# Patient Record
Sex: FEMALE | Race: WHITE | Employment: OTHER | URBAN - METROPOLITAN AREA
[De-identification: names, ages, dates, MRNs, and addresses within clinical notes are randomized per-mention and may not be internally consistent; named-entity substitution may affect disease eponyms.]

---

## 2022-04-16 ENCOUNTER — HOSPITAL ENCOUNTER (EMERGENCY)
Age: 88
Discharge: HOME | End: 2022-04-16
Attending: EMERGENCY MEDICINE
Payer: MEDICARE

## 2022-04-16 ENCOUNTER — APPOINTMENT (OUTPATIENT)
Dept: CT IMAGING | Age: 88
End: 2022-04-16
Attending: EMERGENCY MEDICINE
Payer: MEDICARE

## 2022-04-16 VITALS
RESPIRATION RATE: 16 BRPM | OXYGEN SATURATION: 94 % | TEMPERATURE: 97 F | HEART RATE: 63 BPM | DIASTOLIC BLOOD PRESSURE: 60 MMHG | SYSTOLIC BLOOD PRESSURE: 142 MMHG | WEIGHT: 186.95 LBS

## 2022-04-16 DIAGNOSIS — S00.83XA CONTUSION OF FACE, INITIAL ENCOUNTER: ICD-10-CM

## 2022-04-16 DIAGNOSIS — S01.81XA FACIAL LACERATION, INITIAL ENCOUNTER: ICD-10-CM

## 2022-04-16 DIAGNOSIS — S09.90XA INJURY OF HEAD, INITIAL ENCOUNTER: Primary | ICD-10-CM

## 2022-04-16 LAB
INR PPP: 1.7 (ref 0.9–1.1)
PROTHROMBIN TIME: 16.6 SEC (ref 9–11.1)

## 2022-04-16 PROCEDURE — 85610 PROTHROMBIN TIME: CPT

## 2022-04-16 PROCEDURE — 75810000293 HC SIMP/SUPERF WND  RPR

## 2022-04-16 PROCEDURE — 36415 COLL VENOUS BLD VENIPUNCTURE: CPT

## 2022-04-16 PROCEDURE — 99284 EMERGENCY DEPT VISIT MOD MDM: CPT

## 2022-04-16 PROCEDURE — 72125 CT NECK SPINE W/O DYE: CPT

## 2022-04-16 PROCEDURE — 70450 CT HEAD/BRAIN W/O DYE: CPT

## 2022-04-16 RX ORDER — BACITRACIN 500 UNIT/G
1 PACKET (EA) TOPICAL
Status: DISCONTINUED | OUTPATIENT
Start: 2022-04-16 | End: 2022-04-16 | Stop reason: HOSPADM

## 2022-04-16 RX ORDER — LIDOCAINE HYDROCHLORIDE AND EPINEPHRINE 10; 10 MG/ML; UG/ML
10 INJECTION, SOLUTION INFILTRATION; PERINEURAL ONCE
Status: DISCONTINUED | OUTPATIENT
Start: 2022-04-16 | End: 2022-04-16 | Stop reason: HOSPADM

## 2022-04-16 NOTE — ED TRIAGE NOTES
Pt states she had a GLF tonight while ambulating to bathroom, tripped over sink and hit front left forehead on concrete floor. Pt is taking coumadin for DVT in LLE. Pt denies any dizziness, headache, visual disturbances, pain, or any other symptoms. Denies LOC.

## 2022-04-16 NOTE — ED NOTES
7AM  Change of shift. Care of patient taken over from Dr. Idris Daniels; H&P reviewed, bedside handoff complete. Awaiting INR and CT results    CT results are negative. Discussed with patient  INR subtherapeutic. Discussed with patient, she has not taken her dose yet today  She has no complaints or concerns at time of discharge. No new pains or injuries not addressed earlier. Okay for discharge home. ICD-10-CM ICD-9-CM    1. Injury of head, initial encounter  S09.90XA 959.01    2. Facial laceration, initial encounter  S01.81XA 873.40    3.  Contusion of face, initial encounter  [address] [facility] [address], DO

## 2022-04-16 NOTE — ED NOTES
Bedside and Verbal shift change report given to See Christian RN (oncoming nurse) by Reji Palomo RN (offgoing nurse). Report included the following information SBAR, ED Summary, Intake/Output, MAR and Recent Results.

## 2022-04-16 NOTE — ED PROVIDER NOTES
81-year-old female with a past medical history significant for DVT and chronic anticoagulation with warfarin who presents to the ER after she had a ground-level fall during which time she lost her balance and hit left side of her head against the bathroom sink. She sustained a laceration to the forehead and left eyebrow that was controlled with pressure. She is complaining of headache and neck pain. Denies any loss of consciousness, nausea or vomiting, chest pain or shortness of breath, abdominal pain, dizziness, extremity weakness or numbness. No past medical history on file. No past surgical history on file. No family history on file. Social History     Socioeconomic History    Marital status:      Spouse name: Not on file    Number of children: Not on file    Years of education: Not on file    Highest education level: Not on file   Occupational History    Not on file   Tobacco Use    Smoking status: Not on file    Smokeless tobacco: Not on file   Substance and Sexual Activity    Alcohol use: Not on file    Drug use: Not on file    Sexual activity: Not on file   Other Topics Concern    Not on file   Social History Narrative    Not on file     Social Determinants of Health     Financial Resource Strain:     Difficulty of Paying Living Expenses: Not on file   Food Insecurity:     Worried About Running Out of Food in the Last Year: Not on file    Hue of Food in the Last Year: Not on file   Transportation Needs:     Lack of Transportation (Medical): Not on file    Lack of Transportation (Non-Medical):  Not on file   Physical Activity:     Days of Exercise per Week: Not on file    Minutes of Exercise per Session: Not on file   Stress:     Feeling of Stress : Not on file   Social Connections:     Frequency of Communication with Friends and Family: Not on file    Frequency of Social Gatherings with Friends and Family: Not on file    Attends Scientology Services: Not on file   [address] or Organizations: Not on file    Attends Club or Organization Meetings: Not on file    Marital Status: Not on file   Intimate Partner Violence:     Fear of Current or Ex-Partner: Not on file    Emotionally Abused: Not on file    Physically Abused: Not on file    Sexually Abused: Not on file   Housing Stability:     Unable to Pay for Housing in the Last Year: Not on file    Number of [place] in the Last Year: Not on file    Unstable Housing in the Last Year: Not on file         ALLERGIES: Patient has no allergy information on record. Review of Systems   All other systems reviewed and are negative. There were no vitals filed for this visit. Physical Exam  Vitals and nursing note reviewed. Exam conducted with a chaperone present. CONSTITUTIONAL: Well-appearing; well-nourished; in no apparent distress  HEAD: Normocephalic; 2 cm superficial laceration to the left eyebrow and forehead with swelling and hematoma  EYES: PERRL; EOM intact; conjunctiva and sclera are clear bilaterally. ENT: No rhinorrhea; normal pharynx with no tonsillar hypertrophy; mucous membranes pink/moist, no erythema, no exudate. NECK: Supple; non-tender; no cervical lymphadenopathy  CARD: Normal S1, S2; no murmurs, rubs, or gallops. Regular rate and rhythm. RESP: Normal respiratory effort; breath sounds clear and equal bilaterally; no wheezes, rhonchi, or rales. ABD: Normal bowel sounds; non-distended; non-tender; no palpable organomegaly, no masses, no bruits. Back Exam: Normal inspection; no vertebral point tenderness, no CVA tenderness. Normal range of motion. EXT: Normal ROM in all four extremities; non-tender to palpation; no swelling or deformity; distal pulses are normal, no edema. SKIN: Warm; dry; no rash.   NEURO:Alert and oriented x 3, coherent, SIENNA-XII grossly intact, sensory and motor are non-focal.        MDM  Number of Diagnoses or Management Options  Diagnosis management comments: Assessment: 26-year-old female who presents to the ER for evaluation for head injury with facial laceration status post fall. The patient has chronic anticoagulation with warfarin. She is hemodynamically stable. Need evaluation for ICH or skull fracture. Plan: CT scan of the head and cervical spine/wound care and laceration repair/lab /analgesia radiographic study/ Monitor and Reevaluate. Amount and/or Complexity of Data Reviewed  Clinical lab tests: ordered and reviewed  Tests in the radiology section of CPT®: ordered and reviewed  Tests in the medicine section of CPT®: reviewed and ordered  Discussion of test results with the performing providers: yes  Decide to obtain previous medical records or to obtain history from someone other than the patient: yes  Obtain history from someone other than the patient: yes  Review and summarize past medical records: yes  Discuss the patient with other providers: yes  Independent visualization of images, tracings, or specimens: yes    Risk of Complications, Morbidity, and/or Mortality  Presenting problems: moderate  Diagnostic procedures: moderate  Management options: moderate    Patient Progress  Patient progress: stable         Wound Closure by Adhesive    Date/Time: 4/16/2022 7:04 AM  Performed by: Sofia Crowell MD  Authorized by: Sofia Crowell MD     Consent:     Consent obtained:  Verbal    Consent given by:  Patient    Risks discussed:  Pain, poor cosmetic result and poor wound healing    Alternatives discussed:  No treatment  Anesthesia (see MAR for exact dosages): Anesthesia method:  Local infiltration    Local anesthetic:  Lidocaine 1% WITH epi  Laceration details:     Location: Left eyebrow and forehead. Length (cm):  2    Laceration depth: Superficial.  Repair type:     Repair type:   Intermediate  Pre-procedure details:     Preparation:  Patient was prepped and draped in usual sterile fashion and imaging obtained to evaluate for foreign bodies  Exploration:     Hemostasis achieved with:  Direct pressure    Wound exploration: wound explored through full range of motion and entire depth of wound probed and visualized    Treatment:     Area cleansed with:  Saline    Amount of cleaning:  Extensive    Irrigation solution:  Sterile saline    Irrigation volume:  50    Irrigation method:  Syringe    Visualized foreign bodies/material removed: no    Skin repair:     Repair method:  Sutures    Suture size:  6-0    Suture material:  Prolene    Suture technique:  Simple interrupted    Number of sutures:  10  Approximation:     Approximation:  Close  Post-procedure details:     Dressing:  Antibiotic ointment and bulky dressing    Patient tolerance of procedure: Tolerated well, no immediate complications        6:25 AM  Change of shift. Care of patient signed over to Dr. Mela Hay. Bedside handoff complete. Awaiting lab and CT results and will make disposition as clinically relevant.

## 2024-08-12 ENCOUNTER — TELEPHONE (OUTPATIENT)
Dept: OTHER | Facility: CLINIC | Age: 89
End: 2024-08-12

## 2024-08-12 NOTE — TELEPHONE ENCOUNTER
Patient was outreached to as part of Population Health scheduling activity. Patient may be due for a visit with their primary care provider.    Outcome of call: PATIENT UNAVAILABLE - UNABLE TO LEAVE VOICEMAIL

## 2024-12-09 ENCOUNTER — APPOINTMENT (OUTPATIENT)
Dept: VASCULAR SURGERY | Facility: HOSPITAL | Age: 89
End: 2024-12-09
Attending: EMERGENCY MEDICINE
Payer: MEDICARE

## 2024-12-09 ENCOUNTER — HOSPITAL ENCOUNTER (EMERGENCY)
Facility: HOSPITAL | Age: 89
Discharge: HOME | End: 2024-12-09
Attending: EMERGENCY MEDICINE
Payer: MEDICARE

## 2024-12-09 VITALS
BODY MASS INDEX: 29.73 KG/M2 | HEART RATE: 78 BPM | OXYGEN SATURATION: 95 % | RESPIRATION RATE: 20 BRPM | HEIGHT: 68 IN | WEIGHT: 196.2 LBS | TEMPERATURE: 97.7 F | DIASTOLIC BLOOD PRESSURE: 84 MMHG | SYSTOLIC BLOOD PRESSURE: 147 MMHG

## 2024-12-09 DIAGNOSIS — I82.431 ACUTE DEEP VEIN THROMBOSIS (DVT) OF RIGHT POPLITEAL VEIN (HCC): Primary | ICD-10-CM

## 2024-12-09 LAB
ALBUMIN SERPL-MCNC: 3.6 G/DL (ref 3.5–5)
ALBUMIN/GLOB SERPL: 1 (ref 1.1–2.2)
ALP SERPL-CCNC: 99 U/L (ref 45–117)
ALT SERPL-CCNC: 20 U/L (ref 12–78)
ANION GAP SERPL CALC-SCNC: 6 MMOL/L (ref 2–12)
AST SERPL-CCNC: 22 U/L (ref 15–37)
BASOPHILS # BLD: 0.1 K/UL (ref 0–0.1)
BASOPHILS NFR BLD: 1 % (ref 0–1)
BILIRUB SERPL-MCNC: 0.8 MG/DL (ref 0.2–1)
BUN SERPL-MCNC: 12 MG/DL (ref 6–20)
BUN/CREAT SERPL: 12 (ref 12–20)
CALCIUM SERPL-MCNC: 9.3 MG/DL (ref 8.5–10.1)
CHLORIDE SERPL-SCNC: 108 MMOL/L (ref 97–108)
CO2 SERPL-SCNC: 27 MMOL/L (ref 21–32)
CREAT SERPL-MCNC: 1.04 MG/DL (ref 0.55–1.02)
DIFFERENTIAL METHOD BLD: ABNORMAL
ECHO BSA: 2.07 M2
EOSINOPHIL # BLD: 0.1 K/UL (ref 0–0.4)
EOSINOPHIL NFR BLD: 1 % (ref 0–7)
ERYTHROCYTE [DISTWIDTH] IN BLOOD BY AUTOMATED COUNT: 13.8 % (ref 11.5–14.5)
GLOBULIN SER CALC-MCNC: 3.7 G/DL (ref 2–4)
GLUCOSE SERPL-MCNC: 120 MG/DL (ref 65–100)
HCT VFR BLD AUTO: 39.6 % (ref 35–47)
HGB BLD-MCNC: 12.8 G/DL (ref 11.5–16)
IMM GRANULOCYTES # BLD AUTO: 0.1 K/UL (ref 0–0.04)
IMM GRANULOCYTES NFR BLD AUTO: 1 % (ref 0–0.5)
LYMPHOCYTES # BLD: 1.3 K/UL (ref 0.8–3.5)
LYMPHOCYTES NFR BLD: 19 % (ref 12–49)
MCH RBC QN AUTO: 33 PG (ref 26–34)
MCHC RBC AUTO-ENTMCNC: 32.3 G/DL (ref 30–36.5)
MCV RBC AUTO: 102.1 FL (ref 80–99)
MONOCYTES # BLD: 0.5 K/UL (ref 0–1)
MONOCYTES NFR BLD: 8 % (ref 5–13)
NEUTS SEG # BLD: 4.8 K/UL (ref 1.8–8)
NEUTS SEG NFR BLD: 70 % (ref 32–75)
NRBC # BLD: 0 K/UL (ref 0–0.01)
NRBC BLD-RTO: 0 PER 100 WBC
NT PRO BNP: 833 PG/ML
PLATELET # BLD AUTO: 162 K/UL (ref 150–400)
PMV BLD AUTO: 10.3 FL (ref 8.9–12.9)
POTASSIUM SERPL-SCNC: 3.5 MMOL/L (ref 3.5–5.1)
PROT SERPL-MCNC: 7.3 G/DL (ref 6.4–8.2)
RBC # BLD AUTO: 3.88 M/UL (ref 3.8–5.2)
SODIUM SERPL-SCNC: 141 MMOL/L (ref 136–145)
WBC # BLD AUTO: 6.8 K/UL (ref 3.6–11)

## 2024-12-09 PROCEDURE — 85025 COMPLETE CBC W/AUTO DIFF WBC: CPT

## 2024-12-09 PROCEDURE — 99284 EMERGENCY DEPT VISIT MOD MDM: CPT

## 2024-12-09 PROCEDURE — 93970 EXTREMITY STUDY: CPT

## 2024-12-09 PROCEDURE — 80053 COMPREHEN METABOLIC PANEL: CPT

## 2024-12-09 PROCEDURE — 36415 COLL VENOUS BLD VENIPUNCTURE: CPT

## 2024-12-09 PROCEDURE — 83880 ASSAY OF NATRIURETIC PEPTIDE: CPT

## 2024-12-09 ASSESSMENT — PAIN - FUNCTIONAL ASSESSMENT: PAIN_FUNCTIONAL_ASSESSMENT: NONE - DENIES PAIN

## 2024-12-09 NOTE — ED NOTES
Report given to [facility] on patient going back to facility. Report called and given to Roberto at [facility].

## 2024-12-09 NOTE — ED TRIAGE NOTES
Pt arrives with EMS from [facility]. Nursing staff noticed bilateral LE edema this morning. That is not normal for her and no pain. HX of dementia.

## 2024-12-09 NOTE — ED PROVIDER NOTES
[facility] EMERGENCY DEPT  EMERGENCY DEPARTMENT ENCOUNTER      Pt Name: Naty Mendoza  MRN: [id number]  Birthdate 4/27/1930  Date of evaluation: 12/9/2024  Provider: Khris Grewal MD      HISTORY OF PRESENT ILLNESS      89-year-old female history of hypertension, heart failure, DVT presents to the emergency department EMS from [facility] with concern for bilateral lower extremity swelling.  The patient tells me she has had the swelling for a long time, although, she seems somewhat confused.  Pleasantly confused.  EMS reports they were called to the facility because the swelling is new.  Patient has no history of shortness of breath.  I see history of DVT in the chart but do not see that she is on blood thinners.  She is on Bumex daily.    The history is provided by the patient, the EMS personnel and medical records.           Nursing Notes were reviewed.    REVIEW OF SYSTEMS         Review of Systems        PAST MEDICAL HISTORY     Past Medical History:   Diagnosis Date    DVT of axillary vein, acute (HCC)     Heart failure (HCC)     Hypertension          SURGICAL HISTORY     No past surgical history on file.      CURRENT MEDICATIONS       Previous Medications    No medications on file       ALLERGIES     Patient has no allergy information on record.    FAMILY HISTORY     No family history on file.       SOCIAL HISTORY       Social History     Socioeconomic History    Marital status:    Tobacco Use    Smoking status: Never    Smokeless tobacco: Never   Substance and Sexual Activity    Alcohol use: Not Currently    Drug use: Never         PHYSICAL EXAM       ED Triage Vitals   BP Systolic BP Percentile Diastolic BP Percentile Temp Temp src Pulse Resp SpO2   -- -- -- -- -- -- -- --      Height Weight         -- --             There is no height or weight on file to calculate BMI.    Physical Exam  Vitals and nursing note reviewed.   Constitutional:       Appearance: She is not ill-appearing.

## 2025-03-02 ENCOUNTER — HOSPITAL ENCOUNTER (INPATIENT)
Facility: HOSPITAL | Age: 89
LOS: 3 days | Discharge: NURSING, REHAB OR LONG-TERM CARE | DRG: 194 | End: 2025-03-06
Attending: EMERGENCY MEDICINE | Admitting: FAMILY MEDICINE
Payer: MEDICARE

## 2025-03-02 ENCOUNTER — APPOINTMENT (OUTPATIENT)
Facility: HOSPITAL | Age: 89
DRG: 194 | End: 2025-03-02
Payer: MEDICARE

## 2025-03-02 ENCOUNTER — HOSPITAL ENCOUNTER (EMERGENCY)
Facility: HOSPITAL | Age: 89
Discharge: HOME | DRG: 194 | End: 2025-03-05
Payer: MEDICARE

## 2025-03-02 DIAGNOSIS — I50.9 ACUTE ON CHRONIC CONGESTIVE HEART FAILURE, UNSPECIFIED HEART FAILURE TYPE (HCC): ICD-10-CM

## 2025-03-02 DIAGNOSIS — J96.01 ACUTE RESPIRATORY FAILURE WITH HYPOXIA (HCC): Primary | ICD-10-CM
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
## 2025-03-02 DIAGNOSIS — I50.9 CONGESTIVE HEART FAILURE, UNSPECIFIED HF CHRONICITY, UNSPECIFIED HEART FAILURE TYPE (HCC): ICD-10-CM

## 2025-03-02 DIAGNOSIS — J18.9 PNEUMONIA OF LEFT UPPER LOBE DUE TO INFECTIOUS ORGANISM: ICD-10-CM

## 2025-03-02 LAB
ALBUMIN SERPL-MCNC: 3 G/DL (ref 3.5–5)
ALBUMIN/GLOB SERPL: 0.8 (ref 1.1–2.2)
ALP SERPL-CCNC: 117 U/L (ref 45–117)
ALT SERPL-CCNC: 38 U/L (ref 12–78)
ANION GAP SERPL CALC-SCNC: 7 MMOL/L (ref 2–12)
AST SERPL-CCNC: 49 U/L (ref 15–37)
BASOPHILS # BLD: 0.08 K/UL (ref 0–0.1)
BASOPHILS NFR BLD: 0.7 % (ref 0–1)
BILIRUB SERPL-MCNC: 1.4 MG/DL (ref 0.2–1)
BUN SERPL-MCNC: 15 MG/DL (ref 6–20)
BUN/CREAT SERPL: 14 (ref 12–20)
CALCIUM SERPL-MCNC: 9.2 MG/DL (ref 8.5–10.1)
CHLORIDE SERPL-SCNC: 107 MMOL/L (ref 97–108)
CO2 SERPL-SCNC: 27 MMOL/L (ref 21–32)
COMMENT:: NORMAL
CREAT SERPL-MCNC: 1.07 MG/DL (ref 0.55–1.02)
DIFFERENTIAL METHOD BLD: ABNORMAL
EOSINOPHIL # BLD: 0.05 K/UL (ref 0–0.4)
EOSINOPHIL NFR BLD: 0.4 % (ref 0–7)
ERYTHROCYTE [DISTWIDTH] IN BLOOD BY AUTOMATED COUNT: 13.7 % (ref 11.5–14.5)
FLUAV RNA SPEC QL NAA+PROBE: NOT DETECTED
FLUBV RNA SPEC QL NAA+PROBE: NOT DETECTED
GLOBULIN SER CALC-MCNC: 4 G/DL (ref 2–4)
GLUCOSE SERPL-MCNC: 118 MG/DL (ref 65–100)
HCT VFR BLD AUTO: 32.7 % (ref 35–47)
HGB BLD-MCNC: 10.8 G/DL (ref 11.5–16)
IMM GRANULOCYTES # BLD AUTO: 0.11 K/UL (ref 0–0.04)
IMM GRANULOCYTES NFR BLD AUTO: 0.9 % (ref 0–0.5)
LYMPHOCYTES # BLD: 0.95 K/UL (ref 0.8–3.5)
LYMPHOCYTES NFR BLD: 8.2 % (ref 12–49)
MAGNESIUM SERPL-MCNC: 2 MG/DL (ref 1.6–2.4)
MCH RBC QN AUTO: 32.5 PG (ref 26–34)
MCHC RBC AUTO-ENTMCNC: 33 G/DL (ref 30–36.5)
MCV RBC AUTO: 98.5 FL (ref 80–99)
MONOCYTES # BLD: 0.93 K/UL (ref 0–1)
MONOCYTES NFR BLD: 8 % (ref 5–13)
NEUTS SEG # BLD: 9.48 K/UL (ref 1.8–8)
NEUTS SEG NFR BLD: 81.8 % (ref 32–75)
NRBC # BLD: 0 K/UL (ref 0–0.01)
NRBC BLD-RTO: 0 PER 100 WBC
NT PRO BNP: 2684 PG/ML
PLATELET # BLD AUTO: 191 K/UL (ref 150–400)
PMV BLD AUTO: 11.5 FL (ref 8.9–12.9)
POTASSIUM SERPL-SCNC: 3.3 MMOL/L (ref 3.5–5.1)
PROT SERPL-MCNC: 7 G/DL (ref 6.4–8.2)
RBC # BLD AUTO: 3.32 M/UL (ref 3.8–5.2)
SARS-COV-2 RNA RESP QL NAA+PROBE: NOT DETECTED
SODIUM SERPL-SCNC: 141 MMOL/L (ref 136–145)
SOURCE: NORMAL
SPECIMEN HOLD: NORMAL
TROPONIN I SERPL HS-MCNC: 27 NG/L (ref 0–51)
WBC # BLD AUTO: 11.6 K/UL (ref 3.6–11)

## 2025-03-02 PROCEDURE — 94761 N-INVAS EAR/PLS OXIMETRY MLT: CPT

## 2025-03-02 PROCEDURE — 96375 TX/PRO/DX INJ NEW DRUG ADDON: CPT

## 2025-03-02 PROCEDURE — 83880 ASSAY OF NATRIURETIC PEPTIDE: CPT

## 2025-03-02 PROCEDURE — 71046 X-RAY EXAM CHEST 2 VIEWS: CPT

## 2025-03-02 PROCEDURE — 80053 COMPREHEN METABOLIC PANEL: CPT

## 2025-03-02 PROCEDURE — 6370000000 HC RX 637 (ALT 250 FOR IP): Performed by: EMERGENCY MEDICINE

## 2025-03-02 PROCEDURE — 2500000003 HC RX 250 WO HCPCS: Performed by: EMERGENCY MEDICINE

## 2025-03-02 PROCEDURE — G0378 HOSPITAL OBSERVATION PER HR: HCPCS

## 2025-03-02 PROCEDURE — 93005 ELECTROCARDIOGRAM TRACING: CPT | Performed by: EMERGENCY MEDICINE

## 2025-03-02 PROCEDURE — 87636 SARSCOV2 & INF A&B AMP PRB: CPT

## 2025-03-02 PROCEDURE — 94640 AIRWAY INHALATION TREATMENT: CPT

## 2025-03-02 PROCEDURE — 83735 ASSAY OF MAGNESIUM: CPT

## 2025-03-02 PROCEDURE — 6360000002 HC RX W HCPCS: Performed by: STUDENT IN AN ORGANIZED HEALTH CARE EDUCATION/TRAINING PROGRAM

## 2025-03-02 PROCEDURE — 72125 CT NECK SPINE W/O DYE: CPT

## 2025-03-02 PROCEDURE — 96365 THER/PROPH/DIAG IV INF INIT: CPT

## 2025-03-02 PROCEDURE — 6370000000 HC RX 637 (ALT 250 FOR IP): Performed by: STUDENT IN AN ORGANIZED HEALTH CARE EDUCATION/TRAINING PROGRAM

## 2025-03-02 PROCEDURE — 99285 EMERGENCY DEPT VISIT HI MDM: CPT

## 2025-03-02 PROCEDURE — 6360000002 HC RX W HCPCS: Performed by: EMERGENCY MEDICINE

## 2025-03-02 PROCEDURE — 2500000003 HC RX 250 WO HCPCS: Performed by: STUDENT IN AN ORGANIZED HEALTH CARE EDUCATION/TRAINING PROGRAM

## 2025-03-02 PROCEDURE — 36415 COLL VENOUS BLD VENIPUNCTURE: CPT

## 2025-03-02 PROCEDURE — 84484 ASSAY OF TROPONIN QUANT: CPT

## 2025-03-02 PROCEDURE — 2580000003 HC RX 258: Performed by: EMERGENCY MEDICINE

## 2025-03-02 PROCEDURE — 85025 COMPLETE CBC W/AUTO DIFF WBC: CPT

## 2025-03-02 PROCEDURE — 70450 CT HEAD/BRAIN W/O DYE: CPT

## 2025-03-02 PROCEDURE — 73610 X-RAY EXAM OF ANKLE: CPT

## 2025-03-02 RX ORDER — FUROSEMIDE 10 MG/ML
20 INJECTION INTRAMUSCULAR; INTRAVENOUS 2 TIMES DAILY
Status: DISCONTINUED | OUTPATIENT
Start: 2025-03-02 | End: 2025-03-02

## 2025-03-02 RX ORDER — POTASSIUM CHLORIDE 7.45 MG/ML
10 INJECTION INTRAVENOUS PRN
Status: DISCONTINUED | OUTPATIENT
Start: 2025-03-02 | End: 2025-03-06 | Stop reason: HOSPADM

## 2025-03-02 RX ORDER — POTASSIUM CHLORIDE 750 MG/1
40 TABLET, EXTENDED RELEASE ORAL PRN
Status: DISCONTINUED | OUTPATIENT
Start: 2025-03-02 | End: 2025-03-06 | Stop reason: HOSPADM

## 2025-03-02 RX ORDER — FUROSEMIDE 10 MG/ML
40 INJECTION INTRAMUSCULAR; INTRAVENOUS ONCE
Status: COMPLETED | OUTPATIENT
Start: 2025-03-02 | End: 2025-03-02

## 2025-03-02 RX ORDER — ACETAMINOPHEN 650 MG/1
650 SUPPOSITORY RECTAL EVERY 6 HOURS PRN
Status: DISCONTINUED | OUTPATIENT
Start: 2025-03-02 | End: 2025-03-06 | Stop reason: HOSPADM

## 2025-03-02 RX ORDER — ONDANSETRON 4 MG/1
4 TABLET, ORALLY DISINTEGRATING ORAL EVERY 8 HOURS PRN
Status: DISCONTINUED | OUTPATIENT
Start: 2025-03-02 | End: 2025-03-06 | Stop reason: HOSPADM

## 2025-03-02 RX ORDER — IPRATROPIUM BROMIDE AND ALBUTEROL SULFATE 2.5; .5 MG/3ML; MG/3ML
1 SOLUTION RESPIRATORY (INHALATION)
Status: DISCONTINUED | OUTPATIENT
Start: 2025-03-02 | End: 2025-03-03

## 2025-03-02 RX ORDER — IPRATROPIUM BROMIDE AND ALBUTEROL SULFATE 2.5; .5 MG/3ML; MG/3ML
1 SOLUTION RESPIRATORY (INHALATION)
Status: COMPLETED | OUTPATIENT
Start: 2025-03-02 | End: 2025-03-02

## 2025-03-02 RX ORDER — ACETAMINOPHEN 325 MG/1
650 TABLET ORAL EVERY 6 HOURS PRN
Status: DISCONTINUED | OUTPATIENT
Start: 2025-03-02 | End: 2025-03-06 | Stop reason: HOSPADM

## 2025-03-02 RX ORDER — POTASSIUM CHLORIDE 750 MG/1
40 TABLET, EXTENDED RELEASE ORAL ONCE
Status: COMPLETED | OUTPATIENT
Start: 2025-03-02 | End: 2025-03-02

## 2025-03-02 RX ORDER — DOXYCYCLINE HYCLATE 100 MG
100 TABLET ORAL EVERY 12 HOURS SCHEDULED
Status: DISCONTINUED | OUTPATIENT
Start: 2025-03-02 | End: 2025-03-06 | Stop reason: HOSPADM

## 2025-03-02 RX ORDER — SODIUM CHLORIDE 0.9 % (FLUSH) 0.9 %
5-40 SYRINGE (ML) INJECTION PRN
Status: DISCONTINUED | OUTPATIENT
Start: 2025-03-02 | End: 2025-03-06 | Stop reason: HOSPADM

## 2025-03-02 RX ORDER — METOPROLOL SUCCINATE 25 MG/1
25 TABLET, EXTENDED RELEASE ORAL DAILY
Status: DISCONTINUED | OUTPATIENT
Start: 2025-03-02 | End: 2025-03-06 | Stop reason: HOSPADM

## 2025-03-02 RX ORDER — ENOXAPARIN SODIUM 100 MG/ML
40 INJECTION SUBCUTANEOUS DAILY
Status: DISCONTINUED | OUTPATIENT
Start: 2025-03-02 | End: 2025-03-02

## 2025-03-02 RX ORDER — SODIUM CHLORIDE 9 MG/ML
INJECTION, SOLUTION INTRAVENOUS PRN
Status: DISCONTINUED | OUTPATIENT
Start: 2025-03-02 | End: 2025-03-06 | Stop reason: HOSPADM

## 2025-03-02 RX ORDER — MAGNESIUM SULFATE IN WATER 40 MG/ML
2000 INJECTION, SOLUTION INTRAVENOUS PRN
Status: DISCONTINUED | OUTPATIENT
Start: 2025-03-02 | End: 2025-03-06 | Stop reason: HOSPADM

## 2025-03-02 RX ORDER — FUROSEMIDE 10 MG/ML
20 INJECTION INTRAMUSCULAR; INTRAVENOUS 2 TIMES DAILY
Status: DISCONTINUED | OUTPATIENT
Start: 2025-03-02 | End: 2025-03-04

## 2025-03-02 RX ORDER — METOPROLOL SUCCINATE 25 MG/1
25 TABLET, EXTENDED RELEASE ORAL DAILY
COMMUNITY

## 2025-03-02 RX ORDER — SODIUM CHLORIDE 0.9 % (FLUSH) 0.9 %
5-40 SYRINGE (ML) INJECTION EVERY 12 HOURS SCHEDULED
Status: DISCONTINUED | OUTPATIENT
Start: 2025-03-02 | End: 2025-03-06 | Stop reason: HOSPADM

## 2025-03-02 RX ORDER — POLYETHYLENE GLYCOL 3350 17 G/17G
17 POWDER, FOR SOLUTION ORAL DAILY PRN
Status: DISCONTINUED | OUTPATIENT
Start: 2025-03-02 | End: 2025-03-06 | Stop reason: HOSPADM

## 2025-03-02 RX ORDER — ONDANSETRON 2 MG/ML
4 INJECTION INTRAMUSCULAR; INTRAVENOUS EVERY 6 HOURS PRN
Status: DISCONTINUED | OUTPATIENT
Start: 2025-03-02 | End: 2025-03-06 | Stop reason: HOSPADM

## 2025-03-02 RX ADMIN — DOXYCYCLINE HYCLATE 100 MG: 100 TABLET, COATED ORAL at 20:32

## 2025-03-02 RX ADMIN — FUROSEMIDE 40 MG: 10 INJECTION, SOLUTION INTRAMUSCULAR; INTRAVENOUS at 09:16

## 2025-03-02 RX ADMIN — FUROSEMIDE 20 MG: 10 INJECTION, SOLUTION INTRAMUSCULAR; INTRAVENOUS at 18:40

## 2025-03-02 RX ADMIN — IPRATROPIUM BROMIDE AND ALBUTEROL SULFATE 1 DOSE: .5; 3 SOLUTION RESPIRATORY (INHALATION) at 19:44

## 2025-03-02 RX ADMIN — WATER 1000 MG: 1 INJECTION INTRAMUSCULAR; INTRAVENOUS; SUBCUTANEOUS at 09:38

## 2025-03-02 RX ADMIN — DOXYCYCLINE 100 MG: 100 INJECTION, POWDER, LYOPHILIZED, FOR SOLUTION INTRAVENOUS at 09:45

## 2025-03-02 RX ADMIN — IPRATROPIUM BROMIDE AND ALBUTEROL SULFATE 1 DOSE: .5; 3 SOLUTION RESPIRATORY (INHALATION) at 08:21

## 2025-03-02 RX ADMIN — WATER 60 MG: 1 INJECTION INTRAMUSCULAR; INTRAVENOUS; SUBCUTANEOUS at 09:09

## 2025-03-02 RX ADMIN — SODIUM CHLORIDE, PRESERVATIVE FREE 10 ML: 5 INJECTION INTRAVENOUS at 20:32

## 2025-03-02 RX ADMIN — POTASSIUM CHLORIDE 40 MEQ: 750 TABLET, EXTENDED RELEASE ORAL at 09:14

## 2025-03-02 ASSESSMENT — LIFESTYLE VARIABLES
HOW OFTEN DO YOU HAVE A DRINK CONTAINING ALCOHOL: PATIENT DECLINED
HOW MANY STANDARD DRINKS CONTAINING ALCOHOL DO YOU HAVE ON A TYPICAL DAY: PATIENT DECLINED

## 2025-03-02 ASSESSMENT — PAIN SCALES - GENERAL: PAINLEVEL_OUTOF10: 0

## 2025-03-02 NOTE — H&P
Hospitalist Admission Note    NAME:  Naty Mendoza   :  1930   MRN:  [id number]     Date/Time:  3/2/2025 10:03 AM    Patient PCP: Naty Jones MD  ________________________________________________________________________    Given the patient's current clinical presentation, I have a high level of concern for decompensation if discharged from the emergency department.  Complex decision making was performed, which includes reviewing the patient's available past medical records, laboratory results, and x-ray films.       My assessment of this patient's clinical condition and my plan of care is as follows.    Assessment / Plan:    Naty is a 89 y.o. female who presented to ED for a fall.      Witnessed ground-level fall: At the facility.  Resulted in ecchymosis of the right forehead without laceration.  Imaging negative for any dislocation or fractures.  Will get therapy assessment.    Acute hypoxic respiratory failure: O2 saturation was 85% on room air in the ED.  Improved with nasal cannula.  Keep sats above 92 %    Acute CHF: She has bilateral leg edema.  BNP mildly elevated to 634. CXR showed no pulmonary edema or pleural effusion.  Will continue with low-dose IV diuresis.  Get echocardiogram.  Will continue metoprolol 25 mg p.o. daily.    Community-acquired pneumonia: CT cervical spine showed left upper lobe pneumonia.  She has mild cough.  COVID, influenza A/B negative.  Continue ceftriaxone, doxycycline.  Get sputum culture.    History of A-fib, bradycardia: s/p [employer] J[employer]EN pacemaker implanted in 2010.  CT head negative for any intracranial bleed.  Continue Eliquis and metoprolol.    Hypokalemia: Replace potassium.  Continue to monitor.    Elevated creatinine: Likely underlying CKD stage III.  Monitor creatinine.    History of dementia: Seems like at baseline.  Not on medications.        I have personally reviewed the radiographs, laboratory data in Epic and

## 2025-03-02 NOTE — ED PROVIDER NOTES
patient from another provider in my specialty: no      Care discussed with: admitting provider              (Please note that portions of this note were completed with a voice recognition program.  Efforts were made to edit the dictations but occasionally words are mis-transcribed.)    Lucas Medina MD (electronically signed)  Emergency Attending Physician              Lucas Medina MD  03/02/25 0937

## 2025-03-02 NOTE — ED TRIAGE NOTES
Pt arrives for GLF that occurred today while walking to the bathroom. Pt was being assisted by staff to the bathroom when she slipped on her sock and fell. EMS reports that pt brace herself against the door but still feel forward and hit head. Pt arrives with edema and ecchymosis to R forehead and co R ankle pain    Per paperwork that arrived with pt, she takes 2.5mg BID

## 2025-03-03 ENCOUNTER — APPOINTMENT (OUTPATIENT)
Facility: HOSPITAL | Age: 89
DRG: 194 | End: 2025-03-03
Payer: MEDICARE
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
## 2025-03-03 LAB
ANION GAP SERPL CALC-SCNC: 7 MMOL/L (ref 2–12)
ANION GAP SERPL CALC-SCNC: 8 MMOL/L (ref 2–12)
BUN SERPL-MCNC: 20 MG/DL (ref 6–20)
BUN SERPL-MCNC: 25 MG/DL (ref 6–20)
BUN/CREAT SERPL: 23 (ref 12–20)
BUN/CREAT SERPL: 27 (ref 12–20)
CALCIUM SERPL-MCNC: 9.3 MG/DL (ref 8.5–10.1)
CALCIUM SERPL-MCNC: 9.3 MG/DL (ref 8.5–10.1)
CHLORIDE SERPL-SCNC: 107 MMOL/L (ref 97–108)
CHLORIDE SERPL-SCNC: 110 MMOL/L (ref 97–108)
CO2 SERPL-SCNC: 29 MMOL/L (ref 21–32)
CO2 SERPL-SCNC: 30 MMOL/L (ref 21–32)
CREAT SERPL-MCNC: 0.86 MG/DL (ref 0.55–1.02)
CREAT SERPL-MCNC: 0.91 MG/DL (ref 0.55–1.02)
ECHO AR MAX VEL PISA: 3 M/S
ECHO AV AREA PEAK VELOCITY: 2.7 CM2
ECHO AV AREA VTI: 2.9 CM2
ECHO AV AREA/BSA PEAK VELOCITY: 1.3 CM2/M2
ECHO AV AREA/BSA VTI: 1.4 CM2/M2
ECHO AV MEAN GRADIENT: 4 MMHG
ECHO AV MEAN VELOCITY: 0.9 M/S
ECHO AV PEAK GRADIENT: 7 MMHG
ECHO AV PEAK VELOCITY: 1.3 M/S
ECHO AV REGURGITANT PHT: 455.5 MS
ECHO AV VELOCITY RATIO: 1
ECHO AV VTI: 29.8 CM
ECHO BSA: 2.07 M2
ECHO LA DIAMETER INDEX: 2.71 CM/M2
ECHO LA DIAMETER: 5.5 CM
ECHO LA VOL A-L A2C: 139 ML (ref 22–52)
ECHO LA VOL A-L A4C: 162 ML (ref 22–52)
ECHO LA VOL BP: 146 ML (ref 22–52)
ECHO LA VOL MOD A2C: 133 ML (ref 22–52)
ECHO LA VOL MOD A4C: 151 ML (ref 22–52)
ECHO LA VOL/BSA BIPLANE: 72 ML/M2 (ref 16–34)
ECHO LA VOLUME AREA LENGTH: 155 ML
ECHO LA VOLUME INDEX A-L A2C: 68 ML/M2 (ref 16–34)
ECHO LA VOLUME INDEX A-L A4C: 80 ML/M2 (ref 16–34)
ECHO LA VOLUME INDEX AREA LENGTH: 76 ML/M2 (ref 16–34)
ECHO LA VOLUME INDEX MOD A2C: 66 ML/M2 (ref 16–34)
ECHO LA VOLUME INDEX MOD A4C: 74 ML/M2 (ref 16–34)
ECHO LV E' LATERAL VELOCITY: 10.8 CM/S
ECHO LV E' SEPTAL VELOCITY: 5.03 CM/S
ECHO LV EDV A2C: 83 ML
ECHO LV EDV A4C: 65 ML
ECHO LV EDV BP: 78 ML (ref 56–104)
ECHO LV EDV INDEX A4C: 32 ML/M2
ECHO LV EDV INDEX BP: 38 ML/M2
ECHO LV EDV NDEX A2C: 41 ML/M2
ECHO LV EF PHYSICIAN: 55 %
ECHO LV EJECTION FRACTION A2C: 56 %
ECHO LV EJECTION FRACTION A4C: 46 %
ECHO LV EJECTION FRACTION BIPLANE: 51 % (ref 55–100)
ECHO LV ESV A2C: 36 ML
ECHO LV ESV A4C: 35 ML
ECHO LV ESV BP: 38 ML (ref 19–49)
ECHO LV ESV INDEX A2C: 18 ML/M2
ECHO LV ESV INDEX A4C: 17 ML/M2
ECHO LV ESV INDEX BP: 19 ML/M2
ECHO LV FRACTIONAL SHORTENING: 25 % (ref 28–44)
ECHO LV INTERNAL DIMENSION DIASTOLE INDEX: 2.51 CM/M2
ECHO LV INTERNAL DIMENSION DIASTOLIC: 5.1 CM (ref 3.9–5.3)
ECHO LV INTERNAL DIMENSION SYSTOLIC INDEX: 1.87 CM/M2
ECHO LV INTERNAL DIMENSION SYSTOLIC: 3.8 CM
ECHO LV IVSD: 0.7 CM (ref 0.6–0.9)
ECHO LV MASS 2D: 118.7 G (ref 67–162)
ECHO LV MASS INDEX 2D: 58.5 G/M2 (ref 43–95)
ECHO LV POSTERIOR WALL DIASTOLIC: 0.7 CM (ref 0.6–0.9)
ECHO LV RELATIVE WALL THICKNESS RATIO: 0.27
ECHO LVOT AREA: 2.8 CM2
ECHO LVOT AV VTI INDEX: 1.06
ECHO LVOT DIAM: 1.9 CM
ECHO LVOT MEAN GRADIENT: 4 MMHG
ECHO LVOT PEAK GRADIENT: 7 MMHG
ECHO LVOT PEAK VELOCITY: 1.3 M/S
ECHO LVOT STROKE VOLUME INDEX: 44.3 ML/M2
ECHO LVOT SV: 89.8 ML
ECHO LVOT VTI: 31.7 CM
ECHO MV A VELOCITY: 0.33 M/S
ECHO MV AREA VTI: 3.1 CM2
ECHO MV E DECELERATION TIME (DT): 221.7 MS
ECHO MV E VELOCITY: 1.09 M/S
ECHO MV E/A RATIO: 3.3
ECHO MV E/E' LATERAL: 10.09
ECHO MV E/E' RATIO (AVERAGED): 15.88
ECHO MV E/E' SEPTAL: 21.67
ECHO MV LVOT VTI INDEX: 0.92
ECHO MV MAX VELOCITY: 1.2 M/S
ECHO MV MEAN GRADIENT: 2 MMHG
ECHO MV MEAN VELOCITY: 0.7 M/S
ECHO MV PEAK GRADIENT: 6 MMHG
ECHO MV REGURGITANT PEAK GRADIENT: 96 MMHG
ECHO MV REGURGITANT PEAK VELOCITY: 4.9 M/S
ECHO MV VTI: 29.3 CM
ECHO RV FREE WALL PEAK S': 11.2 CM/S
ECHO RV INTERNAL DIMENSION: 4.1 CM
ECHO RV TAPSE: 2 CM (ref 1.7–?)
ECHO TV REGURGITANT MAX VELOCITY: 3.2 M/S
ECHO TV REGURGITANT PEAK GRADIENT: 41 MMHG
EKG DIAGNOSIS: NORMAL
EKG Q-T INTERVAL: 600 MS
EKG QRS DURATION: 96 MS
EKG QTC CALCULATION (BAZETT): 600 MS
EKG R AXIS: 73 DEGREES
EKG T AXIS: 79 DEGREES
EKG VENTRICULAR RATE: 60 BPM
GLUCOSE SERPL-MCNC: 122 MG/DL (ref 65–100)
GLUCOSE SERPL-MCNC: 129 MG/DL (ref 65–100)
MAGNESIUM SERPL-MCNC: 2.2 MG/DL (ref 1.6–2.4)
POTASSIUM SERPL-SCNC: 3.5 MMOL/L (ref 3.5–5.1)
POTASSIUM SERPL-SCNC: 3.6 MMOL/L (ref 3.5–5.1)
SODIUM SERPL-SCNC: 145 MMOL/L (ref 136–145)
SODIUM SERPL-SCNC: 146 MMOL/L (ref 136–145)

## 2025-03-03 PROCEDURE — 97116 GAIT TRAINING THERAPY: CPT

## 2025-03-03 PROCEDURE — 1100000000 HC RM PRIVATE

## 2025-03-03 PROCEDURE — 80048 BASIC METABOLIC PNL TOTAL CA: CPT

## 2025-03-03 PROCEDURE — 94761 N-INVAS EAR/PLS OXIMETRY MLT: CPT

## 2025-03-03 PROCEDURE — 93306 TTE W/DOPPLER COMPLETE: CPT | Performed by: INTERNAL MEDICINE

## 2025-03-03 PROCEDURE — 6370000000 HC RX 637 (ALT 250 FOR IP): Performed by: STUDENT IN AN ORGANIZED HEALTH CARE EDUCATION/TRAINING PROGRAM

## 2025-03-03 PROCEDURE — 51798 US URINE CAPACITY MEASURE: CPT

## 2025-03-03 PROCEDURE — G0378 HOSPITAL OBSERVATION PER HR: HCPCS

## 2025-03-03 PROCEDURE — 94640 AIRWAY INHALATION TREATMENT: CPT

## 2025-03-03 PROCEDURE — 83735 ASSAY OF MAGNESIUM: CPT

## 2025-03-03 PROCEDURE — 93010 ELECTROCARDIOGRAM REPORT: CPT | Performed by: SPECIALIST

## 2025-03-03 PROCEDURE — 2500000003 HC RX 250 WO HCPCS: Performed by: STUDENT IN AN ORGANIZED HEALTH CARE EDUCATION/TRAINING PROGRAM

## 2025-03-03 PROCEDURE — 97535 SELF CARE MNGMENT TRAINING: CPT

## 2025-03-03 PROCEDURE — 93306 TTE W/DOPPLER COMPLETE: CPT

## 2025-03-03 PROCEDURE — 97165 OT EVAL LOW COMPLEX 30 MIN: CPT

## 2025-03-03 PROCEDURE — 36415 COLL VENOUS BLD VENIPUNCTURE: CPT

## 2025-03-03 PROCEDURE — 97162 PT EVAL MOD COMPLEX 30 MIN: CPT

## 2025-03-03 PROCEDURE — 6360000002 HC RX W HCPCS: Performed by: STUDENT IN AN ORGANIZED HEALTH CARE EDUCATION/TRAINING PROGRAM

## 2025-03-03 RX ORDER — IPRATROPIUM BROMIDE AND ALBUTEROL SULFATE 2.5; .5 MG/3ML; MG/3ML
1 SOLUTION RESPIRATORY (INHALATION)
Status: DISCONTINUED | OUTPATIENT
Start: 2025-03-03 | End: 2025-03-05

## 2025-03-03 RX ADMIN — SODIUM CHLORIDE, PRESERVATIVE FREE 10 ML: 5 INJECTION INTRAVENOUS at 08:30

## 2025-03-03 RX ADMIN — IPRATROPIUM BROMIDE AND ALBUTEROL SULFATE 1 DOSE: .5; 3 SOLUTION RESPIRATORY (INHALATION) at 08:46

## 2025-03-03 RX ADMIN — METOPROLOL SUCCINATE 25 MG: 25 TABLET, EXTENDED RELEASE ORAL at 08:29

## 2025-03-03 RX ADMIN — DOXYCYCLINE HYCLATE 100 MG: 100 TABLET, COATED ORAL at 19:55

## 2025-03-03 RX ADMIN — FUROSEMIDE 20 MG: 10 INJECTION, SOLUTION INTRAMUSCULAR; INTRAVENOUS at 16:50

## 2025-03-03 RX ADMIN — APIXABAN 2.5 MG: 5 TABLET, FILM COATED ORAL at 08:29

## 2025-03-03 RX ADMIN — DOXYCYCLINE HYCLATE 100 MG: 100 TABLET, COATED ORAL at 08:30

## 2025-03-03 RX ADMIN — APIXABAN 2.5 MG: 5 TABLET, FILM COATED ORAL at 19:55

## 2025-03-03 RX ADMIN — WATER 1000 MG: 1 INJECTION INTRAMUSCULAR; INTRAVENOUS; SUBCUTANEOUS at 08:28

## 2025-03-03 RX ADMIN — FUROSEMIDE 20 MG: 10 INJECTION, SOLUTION INTRAMUSCULAR; INTRAVENOUS at 08:28

## 2025-03-03 ASSESSMENT — PAIN SCALES - GENERAL
PAINLEVEL_OUTOF10: 0

## 2025-03-04 LAB
BASOPHILS # BLD: 0 K/UL (ref 0–0.1)
BASOPHILS NFR BLD: 0 % (ref 0–1)
DIFFERENTIAL METHOD BLD: ABNORMAL
EOSINOPHIL # BLD: 0.27 K/UL (ref 0–0.4)
EOSINOPHIL NFR BLD: 2 % (ref 0–7)
ERYTHROCYTE [DISTWIDTH] IN BLOOD BY AUTOMATED COUNT: 13.4 % (ref 11.5–14.5)
GLUCOSE BLD STRIP.AUTO-MCNC: 101 MG/DL (ref 65–117)
GLUCOSE BLD STRIP.AUTO-MCNC: 108 MG/DL (ref 65–117)
HCT VFR BLD AUTO: 31.6 % (ref 35–47)
HGB BLD-MCNC: 10.2 G/DL (ref 11.5–16)
IMM GRANULOCYTES # BLD AUTO: 0 K/UL
IMM GRANULOCYTES NFR BLD AUTO: 0 %
LYMPHOCYTES # BLD: 1.92 K/UL (ref 0.8–3.5)
LYMPHOCYTES NFR BLD: 14 % (ref 12–49)
MCH RBC QN AUTO: 32.8 PG (ref 26–34)
MCHC RBC AUTO-ENTMCNC: 32.3 G/DL (ref 30–36.5)
MCV RBC AUTO: 101.6 FL (ref 80–99)
MONOCYTES # BLD: 0.55 K/UL (ref 0–1)
MONOCYTES NFR BLD: 4 % (ref 5–13)
MYELOCYTES NFR BLD MANUAL: 1 %
NEUTS SEG # BLD: 10.82 K/UL (ref 1.8–8)
NEUTS SEG NFR BLD: 79 % (ref 32–75)
NRBC # BLD: 0 K/UL (ref 0–0.01)
NRBC BLD-RTO: 0 PER 100 WBC
NT PRO BNP: 2030 PG/ML
PLATELET # BLD AUTO: 202 K/UL (ref 150–400)
PMV BLD AUTO: 11.2 FL (ref 8.9–12.9)
PROCALCITONIN SERPL-MCNC: 0.17 NG/ML
RBC # BLD AUTO: 3.11 M/UL (ref 3.8–5.2)
RBC MORPH BLD: ABNORMAL
SERVICE CMNT-IMP: NORMAL
SERVICE CMNT-IMP: NORMAL
WBC # BLD AUTO: 13.7 K/UL (ref 3.6–11)

## 2025-03-04 PROCEDURE — 6360000002 HC RX W HCPCS

## 2025-03-04 PROCEDURE — 2500000003 HC RX 250 WO HCPCS: Performed by: NURSE PRACTITIONER

## 2025-03-04 PROCEDURE — 85025 COMPLETE CBC W/AUTO DIFF WBC: CPT

## 2025-03-04 PROCEDURE — 6370000000 HC RX 637 (ALT 250 FOR IP): Performed by: STUDENT IN AN ORGANIZED HEALTH CARE EDUCATION/TRAINING PROGRAM

## 2025-03-04 PROCEDURE — 6360000002 HC RX W HCPCS: Performed by: STUDENT IN AN ORGANIZED HEALTH CARE EDUCATION/TRAINING PROGRAM

## 2025-03-04 PROCEDURE — 82962 GLUCOSE BLOOD TEST: CPT

## 2025-03-04 PROCEDURE — 94640 AIRWAY INHALATION TREATMENT: CPT

## 2025-03-04 PROCEDURE — 2500000003 HC RX 250 WO HCPCS: Performed by: STUDENT IN AN ORGANIZED HEALTH CARE EDUCATION/TRAINING PROGRAM

## 2025-03-04 PROCEDURE — 6370000000 HC RX 637 (ALT 250 FOR IP): Performed by: FAMILY MEDICINE

## 2025-03-04 PROCEDURE — 83880 ASSAY OF NATRIURETIC PEPTIDE: CPT

## 2025-03-04 PROCEDURE — 84145 PROCALCITONIN (PCT): CPT

## 2025-03-04 PROCEDURE — 1100000000 HC RM PRIVATE

## 2025-03-04 RX ORDER — FUROSEMIDE 40 MG/1
40 TABLET ORAL DAILY
Status: DISCONTINUED | OUTPATIENT
Start: 2025-03-05 | End: 2025-03-06 | Stop reason: HOSPADM

## 2025-03-04 RX ORDER — GUAIFENESIN 200 MG/10ML
400 LIQUID ORAL EVERY 4 HOURS PRN
Status: DISCONTINUED | OUTPATIENT
Start: 2025-03-04 | End: 2025-03-06 | Stop reason: HOSPADM

## 2025-03-04 RX ORDER — FUROSEMIDE 10 MG/ML
20 INJECTION INTRAMUSCULAR; INTRAVENOUS 2 TIMES DAILY
Status: COMPLETED | OUTPATIENT
Start: 2025-03-04 | End: 2025-03-04

## 2025-03-04 RX ADMIN — ACETAMINOPHEN 650 MG: 325 TABLET ORAL at 01:42

## 2025-03-04 RX ADMIN — APIXABAN 2.5 MG: 5 TABLET, FILM COATED ORAL at 09:33

## 2025-03-04 RX ADMIN — SODIUM CHLORIDE, PRESERVATIVE FREE 10 ML: 5 INJECTION INTRAVENOUS at 20:36

## 2025-03-04 RX ADMIN — WATER 1000 MG: 1 INJECTION INTRAMUSCULAR; INTRAVENOUS; SUBCUTANEOUS at 09:34

## 2025-03-04 RX ADMIN — IPRATROPIUM BROMIDE AND ALBUTEROL SULFATE 1 DOSE: .5; 3 SOLUTION RESPIRATORY (INHALATION) at 19:23

## 2025-03-04 RX ADMIN — IPRATROPIUM BROMIDE AND ALBUTEROL SULFATE 1 DOSE: .5; 3 SOLUTION RESPIRATORY (INHALATION) at 06:16

## 2025-03-04 RX ADMIN — SODIUM CHLORIDE, PRESERVATIVE FREE 10 ML: 5 INJECTION INTRAVENOUS at 09:36

## 2025-03-04 RX ADMIN — METOPROLOL SUCCINATE 25 MG: 25 TABLET, EXTENDED RELEASE ORAL at 09:34

## 2025-03-04 RX ADMIN — APIXABAN 2.5 MG: 5 TABLET, FILM COATED ORAL at 20:35

## 2025-03-04 RX ADMIN — GUAIFENESIN 400 MG: 200 SOLUTION ORAL at 04:02

## 2025-03-04 RX ADMIN — FUROSEMIDE 20 MG: 10 INJECTION, SOLUTION INTRAMUSCULAR; INTRAVENOUS at 09:34

## 2025-03-04 RX ADMIN — DOXYCYCLINE HYCLATE 100 MG: 100 TABLET, COATED ORAL at 09:34

## 2025-03-04 RX ADMIN — FUROSEMIDE 20 MG: 10 INJECTION, SOLUTION INTRAMUSCULAR; INTRAVENOUS at 18:30

## 2025-03-04 RX ADMIN — DOXYCYCLINE HYCLATE 100 MG: 100 TABLET, COATED ORAL at 20:35

## 2025-03-04 ASSESSMENT — PAIN SCALES - PAIN ASSESSMENT IN ADVANCED DEMENTIA (PAINAD)
FACIALEXPRESSION: SMILING OR INEXPRESSIVE
TOTALSCORE: 0
BODYLANGUAGE: RELAXED
BREATHING: NORMAL
CONSOLABILITY: NO NEED TO CONSOLE

## 2025-03-04 ASSESSMENT — PAIN SCALES - GENERAL
PAINLEVEL_OUTOF10: 0
PAINLEVEL_OUTOF10: 3
PAINLEVEL_OUTOF10: 0

## 2025-03-04 ASSESSMENT — PAIN DESCRIPTION - DESCRIPTORS: DESCRIPTORS: DULL

## 2025-03-04 ASSESSMENT — PAIN DESCRIPTION - LOCATION: LOCATION: BREAST

## 2025-03-05 ENCOUNTER — APPOINTMENT (OUTPATIENT)
Facility: HOSPITAL | Age: 89
DRG: 194 | End: 2025-03-05
Payer: MEDICARE

## 2025-03-05 LAB
FERRITIN SERPL-MCNC: 567 NG/ML (ref 26–388)
FOLATE SERPL-MCNC: 6.5 NG/ML (ref 5–21)
HAPTOGLOB SERPL-MCNC: 360 MG/DL (ref 30–200)
IRON SATN MFR SERPL: 23 % (ref 20–50)
IRON SERPL-MCNC: 42 UG/DL (ref 35–150)
LDH SERPL L TO P-CCNC: 245 U/L (ref 81–246)
RETICS # AUTO: 0.07 M/UL (ref 0.02–0.08)
RETICS/RBC NFR AUTO: 2 % (ref 0.7–2.1)
TIBC SERPL-MCNC: 183 UG/DL (ref 250–450)
VIT B12 SERPL-MCNC: 522 PG/ML (ref 193–986)

## 2025-03-05 PROCEDURE — 82607 VITAMIN B-12: CPT

## 2025-03-05 PROCEDURE — 83010 ASSAY OF HAPTOGLOBIN QUANT: CPT

## 2025-03-05 PROCEDURE — 94761 N-INVAS EAR/PLS OXIMETRY MLT: CPT

## 2025-03-05 PROCEDURE — 6370000000 HC RX 637 (ALT 250 FOR IP): Performed by: STUDENT IN AN ORGANIZED HEALTH CARE EDUCATION/TRAINING PROGRAM

## 2025-03-05 PROCEDURE — 71045 X-RAY EXAM CHEST 1 VIEW: CPT

## 2025-03-05 PROCEDURE — 6370000000 HC RX 637 (ALT 250 FOR IP): Performed by: FAMILY MEDICINE

## 2025-03-05 PROCEDURE — 94640 AIRWAY INHALATION TREATMENT: CPT

## 2025-03-05 PROCEDURE — 82746 ASSAY OF FOLIC ACID SERUM: CPT

## 2025-03-05 PROCEDURE — 1100000000 HC RM PRIVATE

## 2025-03-05 PROCEDURE — 97535 SELF CARE MNGMENT TRAINING: CPT

## 2025-03-05 PROCEDURE — 83550 IRON BINDING TEST: CPT

## 2025-03-05 PROCEDURE — 83615 LACTATE (LD) (LDH) ENZYME: CPT

## 2025-03-05 PROCEDURE — 2500000003 HC RX 250 WO HCPCS: Performed by: STUDENT IN AN ORGANIZED HEALTH CARE EDUCATION/TRAINING PROGRAM

## 2025-03-05 PROCEDURE — 83540 ASSAY OF IRON: CPT

## 2025-03-05 PROCEDURE — 85045 AUTOMATED RETICULOCYTE COUNT: CPT

## 2025-03-05 PROCEDURE — 82728 ASSAY OF FERRITIN: CPT

## 2025-03-05 PROCEDURE — 97530 THERAPEUTIC ACTIVITIES: CPT

## 2025-03-05 PROCEDURE — 6370000000 HC RX 637 (ALT 250 FOR IP)

## 2025-03-05 PROCEDURE — 6360000002 HC RX W HCPCS: Performed by: STUDENT IN AN ORGANIZED HEALTH CARE EDUCATION/TRAINING PROGRAM

## 2025-03-05 RX ORDER — IPRATROPIUM BROMIDE AND ALBUTEROL SULFATE 2.5; .5 MG/3ML; MG/3ML
1 SOLUTION RESPIRATORY (INHALATION)
Status: DISCONTINUED | OUTPATIENT
Start: 2025-03-05 | End: 2025-03-06

## 2025-03-05 RX ORDER — BENZONATATE 100 MG/1
100 CAPSULE ORAL 3 TIMES DAILY PRN
Status: DISCONTINUED | OUTPATIENT
Start: 2025-03-05 | End: 2025-03-06 | Stop reason: HOSPADM

## 2025-03-05 RX ADMIN — IPRATROPIUM BROMIDE AND ALBUTEROL SULFATE 1 DOSE: .5; 3 SOLUTION RESPIRATORY (INHALATION) at 19:33

## 2025-03-05 RX ADMIN — IPRATROPIUM BROMIDE AND ALBUTEROL SULFATE 1 DOSE: .5; 3 SOLUTION RESPIRATORY (INHALATION) at 15:31

## 2025-03-05 RX ADMIN — SODIUM CHLORIDE, PRESERVATIVE FREE 10 ML: 5 INJECTION INTRAVENOUS at 21:01

## 2025-03-05 RX ADMIN — APIXABAN 2.5 MG: 5 TABLET, FILM COATED ORAL at 08:34

## 2025-03-05 RX ADMIN — APIXABAN 2.5 MG: 5 TABLET, FILM COATED ORAL at 21:01

## 2025-03-05 RX ADMIN — DOXYCYCLINE HYCLATE 100 MG: 100 TABLET, COATED ORAL at 21:01

## 2025-03-05 RX ADMIN — GUAIFENESIN, DEXTROMETHORPHAN HBR 1 TABLET: 600; 30 TABLET ORAL at 12:01

## 2025-03-05 RX ADMIN — FUROSEMIDE 40 MG: 40 TABLET ORAL at 08:34

## 2025-03-05 RX ADMIN — GUAIFENESIN, DEXTROMETHORPHAN HBR 1 TABLET: 600; 30 TABLET ORAL at 21:01

## 2025-03-05 RX ADMIN — DOXYCYCLINE HYCLATE 100 MG: 100 TABLET, COATED ORAL at 08:34

## 2025-03-05 RX ADMIN — WATER 1000 MG: 1 INJECTION INTRAMUSCULAR; INTRAVENOUS; SUBCUTANEOUS at 08:33

## 2025-03-05 RX ADMIN — IPRATROPIUM BROMIDE AND ALBUTEROL SULFATE 1 DOSE: .5; 3 SOLUTION RESPIRATORY (INHALATION) at 08:16

## 2025-03-05 RX ADMIN — BENZONATATE 100 MG: 100 CAPSULE ORAL at 09:48

## 2025-03-05 RX ADMIN — IPRATROPIUM BROMIDE AND ALBUTEROL SULFATE 1 DOSE: .5; 3 SOLUTION RESPIRATORY (INHALATION) at 11:28

## 2025-03-05 RX ADMIN — SODIUM CHLORIDE, PRESERVATIVE FREE 10 ML: 5 INJECTION INTRAVENOUS at 08:34

## 2025-03-05 ASSESSMENT — PAIN SCALES - PAIN ASSESSMENT IN ADVANCED DEMENTIA (PAINAD)
TOTALSCORE: 0
TOTALSCORE: 0
BREATHING: NORMAL
FACIALEXPRESSION: SMILING OR INEXPRESSIVE
FACIALEXPRESSION: SMILING OR INEXPRESSIVE
CONSOLABILITY: NO NEED TO CONSOLE
BODYLANGUAGE: RELAXED
BODYLANGUAGE: RELAXED
BREATHING: NORMAL
BREATHING: NORMAL
TOTALSCORE: 0
CONSOLABILITY: NO NEED TO CONSOLE
CONSOLABILITY: NO NEED TO CONSOLE
FACIALEXPRESSION: SMILING OR INEXPRESSIVE
BODYLANGUAGE: RELAXED

## 2025-03-05 ASSESSMENT — PAIN SCALES - GENERAL
PAINLEVEL_OUTOF10: 0

## 2025-03-06 VITALS
HEIGHT: 68 IN | OXYGEN SATURATION: 96 % | TEMPERATURE: 97.7 F | SYSTOLIC BLOOD PRESSURE: 125 MMHG | RESPIRATION RATE: 16 BRPM | WEIGHT: 184 LBS | BODY MASS INDEX: 27.89 KG/M2 | DIASTOLIC BLOOD PRESSURE: 67 MMHG | HEART RATE: 50 BPM

## 2025-03-06 PROBLEM — I50.9 ACUTE EXACERBATION OF CHRONIC HEART FAILURE (HCC): Status: RESOLVED | Noted: 2025-03-02 | Resolved: 2025-03-06

## 2025-03-06 PROCEDURE — 6370000000 HC RX 637 (ALT 250 FOR IP): Performed by: STUDENT IN AN ORGANIZED HEALTH CARE EDUCATION/TRAINING PROGRAM

## 2025-03-06 PROCEDURE — 6360000002 HC RX W HCPCS: Performed by: STUDENT IN AN ORGANIZED HEALTH CARE EDUCATION/TRAINING PROGRAM

## 2025-03-06 PROCEDURE — 6370000000 HC RX 637 (ALT 250 FOR IP)

## 2025-03-06 PROCEDURE — 2500000003 HC RX 250 WO HCPCS: Performed by: STUDENT IN AN ORGANIZED HEALTH CARE EDUCATION/TRAINING PROGRAM

## 2025-03-06 PROCEDURE — 94761 N-INVAS EAR/PLS OXIMETRY MLT: CPT

## 2025-03-06 PROCEDURE — 94640 AIRWAY INHALATION TREATMENT: CPT

## 2025-03-06 RX ORDER — BENZONATATE 100 MG/1
100 CAPSULE ORAL 3 TIMES DAILY PRN
Qty: 15 CAPSULE | Refills: 0 | Status: SHIPPED | OUTPATIENT
Start: 2025-03-06 | End: 2025-03-13

## 2025-03-06 RX ORDER — BENZONATATE 100 MG/1
100 CAPSULE ORAL 3 TIMES DAILY PRN
Qty: 15 CAPSULE | Refills: 0 | Status: SHIPPED
Start: 2025-03-06 | End: 2025-03-06

## 2025-03-06 RX ORDER — FUROSEMIDE 40 MG/1
20 TABLET ORAL DAILY
Qty: 60 TABLET | Refills: 3 | Status: SHIPPED | OUTPATIENT
Start: 2025-03-07

## 2025-03-06 RX ORDER — IPRATROPIUM BROMIDE AND ALBUTEROL SULFATE 2.5; .5 MG/3ML; MG/3ML
1 SOLUTION RESPIRATORY (INHALATION)
Status: DISCONTINUED | OUTPATIENT
Start: 2025-03-06 | End: 2025-03-06 | Stop reason: HOSPADM

## 2025-03-06 RX ORDER — CEFDINIR 300 MG/1
300 CAPSULE ORAL 2 TIMES DAILY
Qty: 6 CAPSULE | Refills: 0 | Status: SHIPPED | OUTPATIENT
Start: 2025-03-06 | End: 2025-03-09

## 2025-03-06 RX ORDER — FUROSEMIDE 40 MG/1
20 TABLET ORAL DAILY
Qty: 60 TABLET | Refills: 3 | Status: SHIPPED
Start: 2025-03-07 | End: 2025-03-06

## 2025-03-06 RX ORDER — GUAIFENESIN 200 MG/10ML
400 LIQUID ORAL EVERY 4 HOURS PRN
Qty: 400 ML | Refills: 0 | Status: SHIPPED
Start: 2025-03-06 | End: 2025-03-06

## 2025-03-06 RX ORDER — CEFDINIR 300 MG/1
300 CAPSULE ORAL 2 TIMES DAILY
Qty: 6 CAPSULE | Refills: 0 | Status: SHIPPED
Start: 2025-03-06 | End: 2025-03-06

## 2025-03-06 RX ORDER — DOXYCYCLINE HYCLATE 100 MG
100 TABLET ORAL EVERY 12 HOURS SCHEDULED
Qty: 2 TABLET | Refills: 0 | Status: SHIPPED
Start: 2025-03-06 | End: 2025-03-06

## 2025-03-06 RX ORDER — DOXYCYCLINE HYCLATE 100 MG
100 TABLET ORAL EVERY 12 HOURS SCHEDULED
Qty: 2 TABLET | Refills: 0 | Status: SHIPPED | OUTPATIENT
Start: 2025-03-06 | End: 2025-03-07

## 2025-03-06 RX ORDER — GUAIFENESIN 200 MG/10ML
400 LIQUID ORAL EVERY 4 HOURS PRN
Qty: 400 ML | Refills: 0 | Status: SHIPPED | OUTPATIENT
Start: 2025-03-06

## 2025-03-06 RX ADMIN — METOPROLOL SUCCINATE 25 MG: 25 TABLET, EXTENDED RELEASE ORAL at 08:47

## 2025-03-06 RX ADMIN — SODIUM CHLORIDE, PRESERVATIVE FREE 10 ML: 5 INJECTION INTRAVENOUS at 08:47

## 2025-03-06 RX ADMIN — FUROSEMIDE 40 MG: 40 TABLET ORAL at 08:47

## 2025-03-06 RX ADMIN — IPRATROPIUM BROMIDE AND ALBUTEROL SULFATE 1 DOSE: .5; 3 SOLUTION RESPIRATORY (INHALATION) at 07:38

## 2025-03-06 RX ADMIN — WATER 1000 MG: 1 INJECTION INTRAMUSCULAR; INTRAVENOUS; SUBCUTANEOUS at 08:47

## 2025-03-06 RX ADMIN — DOXYCYCLINE HYCLATE 100 MG: 100 TABLET, COATED ORAL at 08:47

## 2025-03-06 RX ADMIN — APIXABAN 2.5 MG: 5 TABLET, FILM COATED ORAL at 08:47

## 2025-03-06 RX ADMIN — GUAIFENESIN, DEXTROMETHORPHAN HBR 1 TABLET: 600; 30 TABLET ORAL at 08:47

## 2025-03-06 ASSESSMENT — PAIN SCALES - PAIN ASSESSMENT IN ADVANCED DEMENTIA (PAINAD)
BODYLANGUAGE: RELAXED
CONSOLABILITY: NO NEED TO CONSOLE
BODYLANGUAGE: RELAXED
TOTALSCORE: 0
FACIALEXPRESSION: SMILING OR INEXPRESSIVE
BREATHING: NORMAL
BREATHING: NORMAL
BODYLANGUAGE: RELAXED
BREATHING: NORMAL
CONSOLABILITY: NO NEED TO CONSOLE
TOTALSCORE: 0
TOTALSCORE: 0
FACIALEXPRESSION: SMILING OR INEXPRESSIVE
FACIALEXPRESSION: SMILING OR INEXPRESSIVE
CONSOLABILITY: NO NEED TO CONSOLE
BODYLANGUAGE: RELAXED
TOTALSCORE: 0
BREATHING: NORMAL
FACIALEXPRESSION: SMILING OR INEXPRESSIVE
CONSOLABILITY: NO NEED TO CONSOLE

## 2025-03-06 ASSESSMENT — PAIN SCALES - GENERAL
PAINLEVEL_OUTOF10: 0

## 2025-03-06 NOTE — DISCHARGE SUMMARY
Hospitalist Discharge Summary     Patient ID:  Naty Mendoza  [id number]  89 y.o.  4/27/1930    Admit date: 3/2/2025    Discharge date and time: 3/6/2025    Admission Diagnoses: Acute respiratory failure with hypoxia (HCC) [J96.01]  Fall, initial encounter [W19.XXXA]  Pneumonia of left upper lobe due to infectious organism [J18.9]  Acute on chronic congestive heart failure, unspecified heart failure type (HCC) [I50.9]  Congestive heart failure, unspecified HF chronicity, unspecified heart failure type (HCC) [I50.9]  Acute exacerbation of chronic heart failure (HCC) [I50.9]  Ground-level fall [W18.30XA]    Discharge Diagnoses:    Principal Problem (Resolved):    Acute exacerbation of chronic heart failure (HCC)  Active Problems:    Ground-level fall         Hospital Course:     89 y.o. female who presented to ED for a fall, found to have HRH 2/2 CAP     Witnessed ground-level fall, PTA: At [facility]. On anticoagulation with Eliquis. Resulted in ecchymosis of the right forehead without laceration. Imaging negative for any IC bleed or MSK injury.  -PT assessment recommends intermittent physical therapy up to 2-3x/week in previous living setting      Acute hypoxic respiratory failure iso Community-acquired pneumonia:   O2 saturation was 85% on room air in the ED. Improved with nasal cannula.   CT cervical spine w incidental left upper lobe pneumonia.   COVID, influenza A/B negative.    3/5/2025 patient desatted at night, and was placed on 2 L nasal cannula  Plan  -Continue ceftriaxone, doxycycline for now. Procal 0.17, can de-escalate to cefdinir + doxy at d/c (EOT 3/9 for 7d course)  -Wean oxygen as tolerated, now on RA  -Tessalon/Mucinex     Acute on chronic HFpEF exacerbation, POA: -> 2684-> 2030. CXR showed no pulmonary edema or pleural effusion. BLE edema. Echo w normal left ventricular systolic function with a visually estimated EF of 55 - 60%. EF by visual approximation is 55%. Left ventricle

## 2025-03-06 NOTE — CARE COORDINATION
Care Management Progress Note    Reason for Admission:   Acute respiratory failure with hypoxia (HCC) [J96.01]  Fall, initial encounter [W19.XXXA]  Pneumonia of left upper lobe due to infectious organism [J18.9]  Acute on chronic congestive heart failure, unspecified heart failure type (HCC) [I50.9]  Congestive heart failure, unspecified HF chronicity, unspecified heart failure type (HCC) [I50.9]  Acute exacerbation of chronic heart failure (HCC) [I50.9]  Ground-level fall [W18.30XA]         Patient Admission Status: Inpatient  RUR:  13%  Hospitalization in the last 30 days (Readmission):  No      Reportedly, pt resides at [facility].  DON at facility is Roberto ([phone])  Step-son is Tay Mendoza ([phone]) and he resides in [place], [place]     Transition Plan of Care:  Pt has been approved to return to [facility].  RN, please call report to [phone]  CM spoke with Brian CANTRELL and they will provide PT services at [facility].  Order for HH was placed in discharge packet along with AVS and today's MAR.  Outpatient follow up  Hospital to Home w/c neil (private pay) has been requested for 3:30 p.m.     No further discharge needs indicated.  Vincent     
Care Management Progress Note    Reason for Admission:   Acute respiratory failure with hypoxia (HCC) [J96.01]  Fall, initial encounter [W19.XXXA]  Pneumonia of left upper lobe due to infectious organism [J18.9]  Acute on chronic congestive heart failure, unspecified heart failure type (HCC) [I50.9]  Congestive heart failure, unspecified HF chronicity, unspecified heart failure type (HCC) [I50.9]  Acute exacerbation of chronic heart failure (HCC) [I50.9]  Ground-level fall [W18.30XA]         Patient Admission Status: Inpatient  RUR:  14%  Hospitalization in the last 30 days (Readmission):  No        Reportedly, pt resides at [facility].  DON at facility is Roberto ([phone])  Step-son is Tay Mendoza ([phone]) and he resides in [place], [place]    Transition Plan of Care:   Plan is for pt to return to [facility].  LEN did onsite eval of pt on 3/5 and pt has been approved to return.  PT/OT evals complete; pt ambulated 10 feet on 3/3 and home health was recommended.  An order will be needed so that [facility] is able to arrange PT services.  Outpatient follow up  Mode of transport will be determined    CM will continue to follow pt until discharged.  Vincent        
Care Management Progress Note    Reason for Admission:   Acute respiratory failure with hypoxia (HCC) [J96.01]  Fall, initial encounter [W19.XXXA]  Pneumonia of left upper lobe due to infectious organism [J18.9]  Acute on chronic congestive heart failure, unspecified heart failure type (HCC) [I50.9]  Congestive heart failure, unspecified HF chronicity, unspecified heart failure type (HCC) [I50.9]  Acute exacerbation of chronic heart failure (HCC) [I50.9]  Ground-level fall [W18.30XA]         Patient Admission Status: Inpatient  RUR: 14% Low  Hospitalization in the last 30 days (Readmission):  No        Transition of care plan:  Medical management ongoing, CM received update from attending patient still on IV diuretics. Anticipate DC on oral abx.   Return to [facility], [phone]. CM unable to reach Roberto but spoke with acting director Tena who confirmed patient will need a bedside eval which will be done first thing in the morning. CM to [employer] to [phone].   Discharge plan communicated with patient and/or discharge caregiver: Yes    Date 1st IMM letter given: Not given  Outpatient follow-up: PCP, Specialist  Transport at discharge: ANDREIA Lau MS  
Subscriber Birth Date Member ID       NATY HARVEY 4/27/1930 [id number]                     PCP: Naty Jones MD   Address: [address] [place] [address] / [facility] [address]   Phone number: [phone]    Pharmacy: No Pharmacies Listed  DC Transport: (P) Family       Transition of care plan:    []Unable to determine at this time. Awaiting clinical progress, and disposition recommendations.    [] Home. No assistance required.     [] Home. Pt refused recommended services.    [] Home with family assistance as needed, and outpatient follow-up.    [] Home with Outpatient PT and outpatient follow-up   Pt aware of OP appt? []Yes, Provider:   []Not scheduled   Transport provider:     [] Home with outpatient services.    Specify:    [x] Home with Home Health   - [place] of Choice offered? [] Yes, Preference:   [] NA    []SNF/IPR   -[][place]edom of Choice offered, and preferences given:   []Listing provided and preferences requested   -Status: []Pending []Accepted:    -Auth required: []Yes []No    -Auth initiated date:   -3 midnight stay required: []Yes []No  Date satisfied:     [] LTC:     [] Home with Hospice   - [place] of Choice offered? [] Yes, Preference:   [] NA    [] [facility] information provided.     [] Other:       Kinza Lau  Case Management Department  For questions or concerns, please PerfectServe

## 2025-03-06 NOTE — PLAN OF CARE
Problem: Occupational Therapy - Adult  Goal: By Discharge: Performs self-care activities at highest level of function for planned discharge setting.  See evaluation for individualized goals.  Description: FUNCTIONAL STATUS PRIOR TO ADMISSION:     ,  ,  ,  ,  ,  ,  ,  ,  ,  ,       HOME SUPPORT: Patient lived at [facility] and required assistance with mobility and self care at baseline; impaired cognition at baseline.    Occupational Therapy Goals:  Initiated 3/3/2025  1.  Patient will perform self-feeding with Modified [place] within 7 day(s).  2.  Patient will perform grooming with Stand by Assist and Set-up in seated with verbal cues within 7 day(s).  3.  Patient will perform upper body dressing with Stand by Assist and Set-up within 7 day(s).  4.  Patient will perform toilet transfers with Contact Guard Assist  within 7 day(s).  5.  Patient will perform all aspects of toileting with Minimal Assist within 7 day(s).  6.  Patient will participate in upper extremity therapeutic exercise/activities with Minimal Assist for 10 minutes within 7 day(s).    7.  Patient will utilize energy conservation techniques during functional activities with verbal and visual cues within 7 day(s).   Outcome: Progressing   OCCUPATIONAL THERAPY TREATMENT  Patient: Naty Mendoza (89 y.o. female)  Date: 3/5/2025  Primary Diagnosis: Acute respiratory failure with hypoxia (HCC) [J96.01]  Fall, initial encounter [W19.XXXA]  Pneumonia of left upper lobe due to infectious organism [J18.9]  Acute on chronic congestive heart failure, unspecified heart failure type (HCC) [I50.9]  Congestive heart failure, unspecified HF chronicity, unspecified heart failure type (HCC) [I50.9]  Acute exacerbation of chronic heart failure (HCC) [I50.9]  Ground-level fall [W18.30XA]       Precautions: Fall Risk                Chart, occupational therapy assessment, plan of care, and goals were reviewed.    ASSESSMENT  Patient continues to benefit from skilled OT 
  Problem: Safety - Adult  Goal: Free from fall injury  3/4/2025 0054 by Elizabeth Galarza RN  Outcome: Progressing  Flowsheets (Taken 3/3/2025 1738 by Enmanuel Miller RN)  Free From Fall Injury:   Based on caregiver fall risk screen, instruct family/caregiver to ask for assistance with transferring infant if caregiver noted to have fall risk factors   Instruct family/caregiver on patient safety  3/3/2025 1348 by Enmanuel Miller RN  Outcome: Progressing  Flowsheets (Taken 3/3/2025 1348)  Free From Fall Injury:   Based on caregiver fall risk screen, instruct family/caregiver to ask for assistance with transferring infant if caregiver noted to have fall risk factors   Instruct family/caregiver on patient safety     Problem: Discharge Planning  Goal: Discharge to home or other facility with appropriate resources  3/4/2025 0054 by Elizabeth Galarza RN  Outcome: Progressing  3/3/2025 1348 by Enmanuel Miller RN  Outcome: Progressing  Flowsheets (Taken 3/3/2025 0800)  Discharge to home or other facility with appropriate resources: Identify barriers to discharge with patient and caregiver     Problem: Occupational Therapy - Adult  Goal: By Discharge: Performs self-care activities at highest level of function for planned discharge setting.  See evaluation for individualized goals.  Description: FUNCTIONAL STATUS PRIOR TO ADMISSION:     ,  ,  ,  ,  ,  ,  ,  ,  ,  ,       HOME SUPPORT: Patient lived at [facility] and required assistance with mobility and self care at baseline; impaired cognition at baseline.    Occupational Therapy Goals:  Initiated 3/3/2025  1.  Patient will perform self-feeding with Modified [place] within 7 day(s).  2.  Patient will perform grooming with Stand by Assist and Set-up in seated with verbal cues within 7 day(s).  3.  Patient will perform upper body dressing with Stand by Assist and Set-up within 7 day(s).  4.  Patient will perform toilet transfers with Contact Guard Assist  within 7 day(s).  5.  Patient will 
  Problem: Safety - Adult  Goal: Free from fall injury  3/4/2025 1055 by Enmanuel Miller RN  Outcome: Progressing  Flowsheets (Taken 3/4/2025 0937)  Free From Fall Injury:   Based on caregiver fall risk screen, instruct family/caregiver to ask for assistance with transferring infant if caregiver noted to have fall risk factors   Instruct family/caregiver on patient safety  3/4/2025 0054 by Elizabeth Galarza RN  Outcome: Progressing  Flowsheets  Taken 3/3/2025 2015 by Elizabeth Galarza RN  Free From Fall Injury: Instruct family/caregiver on patient safety  Taken 3/3/2025 1738 by Enmanuel Miller RN  Free From Fall Injury:   Based on caregiver fall risk screen, instruct family/caregiver to ask for assistance with transferring infant if caregiver noted to have fall risk factors   Instruct family/caregiver on patient safety     Problem: Discharge Planning  Goal: Discharge to home or other facility with appropriate resources  3/4/2025 1055 by Enmanuel Miller RN  Outcome: Progressing  Flowsheets (Taken 3/4/2025 0937)  Discharge to home or other facility with appropriate resources: Identify barriers to discharge with patient and caregiver  3/4/2025 0054 by Elizabeth Galarza RN  Outcome: Progressing     Problem: Skin/Tissue Integrity  Goal: Skin integrity remains intact  Description: 1.  Monitor for areas of redness and/or skin breakdown  2.  Assess vascular access sites hourly  3.  Every 4-6 hours minimum:  Change oxygen saturation probe site  4.  Every 4-6 hours:  If on nasal continuous positive airway pressure, respiratory therapy assess nares and determine need for appliance change or resting period  3/4/2025 1055 by Enmanuel Miller RN  Outcome: Progressing  Flowsheets (Taken 3/4/2025 0937)  Skin Integrity Remains Intact: Monitor for areas of redness and/or skin breakdown  3/4/2025 0054 by Elizabeth Galarza RN  Outcome: Progressing  Flowsheets (Taken 3/3/2025 2015)  Skin Integrity Remains Intact: Monitor for areas of redness and/or skin 
  Problem: Safety - Adult  Goal: Free from fall injury  3/4/2025 2324 by Gladis Luu RN  Outcome: Progressing  Flowsheets (Taken 3/4/2025 1558 by Enmanuel Miller RN)  Free From Fall Injury:   Based on caregiver fall risk screen, instruct family/caregiver to ask for assistance with transferring infant if caregiver noted to have fall risk factors   Instruct family/caregiver on patient safety  3/4/2025 1055 by Enmanuel Miller RN  Outcome: Progressing  Flowsheets (Taken 3/4/2025 0937)  Free From Fall Injury:   Based on caregiver fall risk screen, instruct family/caregiver to ask for assistance with transferring infant if caregiver noted to have fall risk factors   Instruct family/caregiver on patient safety     Problem: Discharge Planning  Goal: Discharge to home or other facility with appropriate resources  3/4/2025 2324 by Gladis Luu RN  Outcome: Progressing  3/4/2025 1055 by Enmanuel Miller RN  Outcome: Progressing  Flowsheets (Taken 3/4/2025 0937)  Discharge to home or other facility with appropriate resources: Identify barriers to discharge with patient and caregiver     Problem: Skin/Tissue Integrity  Goal: Skin integrity remains intact  Description: 1.  Monitor for areas of redness and/or skin breakdown  2.  Assess vascular access sites hourly  3.  Every 4-6 hours minimum:  Change oxygen saturation probe site  4.  Every 4-6 hours:  If on nasal continuous positive airway pressure, respiratory therapy assess nares and determine need for appliance change or resting period  3/4/2025 2324 by Gladis Luu RN  Outcome: Progressing  Flowsheets  Taken 3/4/2025 2035 by Gladis Luu RN  Skin Integrity Remains Intact: Monitor for areas of redness and/or skin breakdown  Taken 3/4/2025 1817 by Lucinda Gillis RN  Skin Integrity Remains Intact: Monitor for areas of redness and/or skin breakdown  3/4/2025 1055 by Enmanuel Miller RN  Outcome: Progressing  Flowsheets (Taken 3/4/2025 0937)  Skin Integrity Remains Intact: Monitor 
  Problem: Safety - Adult  Goal: Free from fall injury  Outcome: Progressing     Problem: Discharge Planning  Goal: Discharge to home or other facility with appropriate resources  Outcome: Progressing     Problem: Skin/Tissue Integrity  Goal: Skin integrity remains intact  Description: 1.  Monitor for areas of redness and/or skin breakdown  2.  Assess vascular access sites hourly  3.  Every 4-6 hours minimum:  Change oxygen saturation probe site  4.  Every 4-6 hours:  If on nasal continuous positive airway pressure, respiratory therapy assess nares and determine need for appliance change or resting period  Outcome: Progressing  Flowsheets (Taken 3/5/2025 2100)  Skin Integrity Remains Intact: Monitor for areas of redness and/or skin breakdown     Problem: Occupational Therapy - Adult  Goal: By Discharge: Performs self-care activities at highest level of function for planned discharge setting.  See evaluation for individualized goals.  Description: FUNCTIONAL STATUS PRIOR TO ADMISSION:     ,  ,  ,  ,  ,  ,  ,  ,  ,  ,       HOME SUPPORT: Patient lived at [facility] and required assistance with mobility and self care at baseline; impaired cognition at baseline.    Occupational Therapy Goals:  Initiated 3/3/2025  1.  Patient will perform self-feeding with Modified [place] within 7 day(s).  2.  Patient will perform grooming with Stand by Assist and Set-up in seated with verbal cues within 7 day(s).  3.  Patient will perform upper body dressing with Stand by Assist and Set-up within 7 day(s).  4.  Patient will perform toilet transfers with Contact Guard Assist  within 7 day(s).  5.  Patient will perform all aspects of toileting with Minimal Assist within 7 day(s).  6.  Patient will participate in upper extremity therapeutic exercise/activities with Minimal Assist for 10 minutes within 7 day(s).    7.  Patient will utilize energy conservation techniques during functional activities with verbal and visual cues within 7 
  Problem: Safety - Adult  Goal: Free from fall injury  Outcome: Progressing  Flowsheets (Taken 3/3/2025 1348)  Free From Fall Injury:   Based on caregiver fall risk screen, instruct family/caregiver to ask for assistance with transferring infant if caregiver noted to have fall risk factors   Instruct family/caregiver on patient safety     Problem: Discharge Planning  Goal: Discharge to home or other facility with appropriate resources  Outcome: Progressing  Flowsheets (Taken 3/3/2025 0800)  Discharge to home or other facility with appropriate resources: Identify barriers to discharge with patient and caregiver     Problem: Skin/Tissue Integrity  Goal: Skin integrity remains intact  Description: 1.  Monitor for areas of redness and/or skin breakdown  2.  Assess vascular access sites hourly  3.  Every 4-6 hours minimum:  Change oxygen saturation probe site  4.  Every 4-6 hours:  If on nasal continuous positive airway pressure, respiratory therapy assess nares and determine need for appliance change or resting period  Outcome: Progressing  Flowsheets (Taken 3/3/2025 0800)  Skin Integrity Remains Intact: Monitor for areas of redness and/or skin breakdown     Problem: Occupational Therapy - Adult  Goal: By Discharge: Performs self-care activities at highest level of function for planned discharge setting.  See evaluation for individualized goals.  Description: FUNCTIONAL STATUS PRIOR TO ADMISSION:     ,  ,  ,  ,  ,  ,  ,  ,  ,  ,       HOME SUPPORT: Patient lived at [facility] and required assistance with mobility and self care at baseline; impaired cognition at baseline.    Occupational Therapy Goals:  Initiated 3/3/2025  1.  Patient will perform self-feeding with Modified [place] within 7 day(s).  2.  Patient will perform grooming with Stand by Assist and Set-up in seated with verbal cues within 7 day(s).  3.  Patient will perform upper body dressing with Stand by Assist and Set-up within 7 day(s).  4.  Patient will 
sitting.Continue goals.         PLAN:  Patient continues to benefit from skilled intervention to address the above impairments.  Continue treatment per established plan of care.        Recommendation for discharge: (in order for the patient to meet his/her long term goals):   Intermittent physical therapy up to 2-3x/week in previous living setting with additional support needed     Other factors to consider for discharge:       IF patient discharges home will need the following DME: rolling walker       SUBJECTIVE:       OBJECTIVE DATA SUMMARY:   Critical Behavior:  Orientation  Overall Orientation Status: Impaired  Orientation Level: Oriented to person;Oriented to place       Functional Mobility Training:  Bed Mobility:  Bed Mobility Training  Overall Level of Assistance: Contact guard assistance  Supine to Sit: Contact guard assistance  Transfers:  Transfer Training  Sit to Stand: Contact guard assistance;Minimal assistance;2 Person assistance  Stand to Sit: Minimal assistance;Contact guard assistance  Balance:  Balance  Sitting: Intact  Standing: With support  Standing - Static: Fair   Ambulation/Gait Training:     Gait  Overall Level of Assistance: Minimal assistance;2 Person assistance  Distance (ft): 6 Feet  Assistive Device: Gait belt;Walker, rolling  Base of Support: Narrowed  Speed/Jonna: Pace decreased (< 100 feet/min)  Step Length: Right shortened;Left shortened  Gait Abnormalities: Decreased step clearance         Activity Tolerance:   Fair     After treatment:   Patient left in no apparent distress sitting up in chair      COMMUNICATION/EDUCATION:   The patient's plan of care was discussed with: physical therapist           Hernando Valles PTA  Minutes: 23      
support;Good  Standing - Dynamic: Constant support;Fair      ADL Assessment:          Feeding: Setup       Grooming: Minimal assistance       UE Bathing: Minimal assistance       Product Used : Bath wipes;Chlorhexidine wipes    LE Bathing: Moderate assistance       UE Dressing: Minimal assistance  UE Dressing Skilled Clinical Factors: seated in chair to doff/don gown    LE Dressing: Maximum assistance       Toileting: Moderate assistance                         ADL Intervention and task modifications:        Pt educated on safe transfer techniques, with specific emphasis on proper hand placement to push up from seated surface rather than attempt to pull self up, fully positioning self in-front of desired seated location, feeling chair on back of legs and reaching back with 1-2 UE to slowly lower self to seated position.        [facility] \"6 Clicks\"                                                       Daily Activity Inpatient Short Form  AM-PAC Daily Activity - Inpatient   How much help is needed for putting on and taking off regular lower body clothing?: A Lot  How much help is needed for bathing (which includes washing, rinsing, drying)?: A Lot  How much help is needed for toileting (which includes using toilet, bedpan, or urinal)?: A Lot  How much help is needed for putting on and taking off regular upper body clothing?: A Little  How much help is needed for taking care of personal grooming?: A Little  How much help for eating meals?: A Little  [facility]: 15  AM-PAC Inpatient ADL T-Scale Score : 34.69  ADL Inpatient CMS 0-100% Score: 56.46  ADL Inpatient CMS G-Code Modifier : CK     Interpretation of Tool:  Represents clinically-significant functional categories (i.e. Activities of daily living).  Cutoff score 39.4 (19) correlates to a good likelihood of discharging home versus a facility  Brooke Kim, Jeanne Magallanes, Wilner Fair, Shalini Velasquez, 
Scores With Discharge Destination. Phys Ther. 2021 Apr 4;101(4):vxte926. doi: 10.1093/ptj/xrpi019. PMID: 28642124.  3. Jami MARTINEZ, Jan SALAZAR, Joanne S, Matthew GARIBAY, James MICHAUD. Activity Measure for Post-Acute Care \"6-Clicks\" Basic Mobility Scores Predict Discharge Destination After Acute Care Hospitalization in Select Patient Groups: A Retrospective, Observational Study. Arch Rehabil Res Clin Transl. 2022 Jul 16;4(3):721074. doi: 10.1016/j.[id number]. PMID: 73439843; PMCID: [id number].  4. Judy FISH, Christine S, David W, Sravani P. AM-PAC Short Forms Manual 4.0. Revised 2/2020.                                                                                                                                                                                                                               Pain Rating:  None noted    Activity Tolerance:   Fair     After treatment:   Patient left in no apparent distress sitting up in chair, Call bell within reach, and Bed/ chair alarm activated    COMMUNICATION/EDUCATION:   The patient's plan of care was discussed with: occupational therapist and registered nurse    Patient Education  Education Given To: Patient  Education Provided: Plan of Care  Education Method: Demonstration;Verbal  Barriers to Learning: None  Education Outcome: Verbalized understanding    Thank you for this referral.  Anneliese Kraft PT,DPT,NCS,CLT  Minutes: 17      Physical Therapy Evaluation Charge Determination   History Examination Presentation Decision-Making   MEDIUM  Complexity : 1-2 comorbidities / personal factors will impact the outcome/ POC  MEDIUM Complexity : 3 Standardized tests and measures addressin body structure, function, activity limitation and / or participation in recreation  MEDIUM Complexity : Evolving with changing characteristics  AM-PAC  MEDIUM   Based on the above components, the patient evaluation is determined to be of the following complexity level: Medium

## 2025-03-06 NOTE — PROGRESS NOTES
Physician Progress Note      PATIENT:               ASHLY HARVEY  [facility] #:                  [id number]  :                       1930  ADMIT DATE:       3/2/2025 7:49 AM  DISCH DATE:  RESPONDING  PROVIDER #:        Richard Segura MD          QUERY TEXT:    Patient admitted with Ground-level fall. Noted documentation of Acute on   chronic HFpEF in IM PN on  with no Pulmonary Edema, no JVD, orthopnea or   pnd, no rales, no pleural effusion. In order to support the diagnosis of Acute   on chronic HFpEF, please include additional clinical indicators in your   documentation.  Or please document if the diagnosis of Acute on chronic HFpEF   has been ruled out after further study.    The medical record reflects the following:  Risk Factors: HTN, CKD, 89 year old female    Clinical Indicators: H&P  \" Acute CHF: She has bilateral leg edema.  BNP   mildly elevated to 634. CXR showed no pulmonary edema or pleural effusion.    Will continue with low-dose IV diuresis.  Get echocardiogram. \"    IM PN  \" Acute on chronic HFpEF exacerbation, POA: -> 2684-> 2030.   CXR showed no pulmonary edema or pleural effusion. BLE edema. Echo w normal   left ventricular systolic function with a visually estimated EF of 55 - 60%.   EF by visual approximation is 55%. Left ventricle size is normal. Normal wall   thickness. Normal wall motion. Abnormal diastolic function.  -S/p IV diuresis- BLE edema/respiratory status improved, will transition to PO   Lasix today. \"    Cardiology: Positive for leg edema, negative for chest pain, palpitations,   orthopnea, PND, syncope  Respiratory: Positive for cough, wheezing, negative for pleuritic pain, SOB,   SCHULER, no rales    CXR  \" Cardiomegaly. Clear lungs. \"    On  NT Pro BNP- 2684  On  NT Pro BNP- 2030    Treatment: IV Lasix, Echo, IVF, BNP, CXR    Thank you,  Ariel MACHADO CDS  Options provided:  -- Acute on chronic HFpEF present as evidenced by, Please 
1050: Report called to Roberto at [facility]. Discharge instruction printed and will send with patient to facility. Transport to be here at 1530.  
JAGDEEP LEE [facility]  [address] [place] [address] [phone]        Hospitalist Progress Note      NAME: Naty Mendoza   :  1930  MRM:  [id number]    Date/Time of service: 3/4/2025  11:53 AM       Subjective:     Chief Complaint:  Patient was personally seen and examined by me during this time period.  Chart reviewed.      Pt was seen this morning resting comfortably in bed. Her mentation is improved today, she remembers she lives at [facility] and that her step-son Tay is her point of contact. She has no new complaints this morning.       Objective:       Vitals:       Last 24hrs VS reviewed since prior progress note. Most recent are:    Vitals:    25 1153   BP: (!) 149/74   Pulse: 54   Resp: 18   Temp: 97.7 °F (36.5 °C)   SpO2: 93%     SpO2 Readings from Last 6 Encounters:   25 93%   24 95%          Intake/Output Summary (Last 24 hours) at 3/4/2025 1153  Last data filed at 3/4/2025 1031  Gross per 24 hour   Intake 700 ml   Output 650 ml   Net 50 ml        Exam:     Physical Exam:    Gen:  Well-developed, well-nourished, in no acute distress  HEENT:  Pink conjunctivae, [place] BL, moist mucous membranes  Neck:  Supple, without masses, thyroid non-tender  Resp:  No accessory muscle use, breath sounds without wheezes rales or rhonchi  Card:  S1, S2 without thrills, bruits or peripheral edema  Abd:  Soft, non-tender, non-distended, normoactive bowel sounds are present  Musc:  No cyanosis or clubbing  Skin:  No rashes or ulcers, skin turgor is good. + R forehead ecchymosis  Neuro:  Cranial nerves 3-12 are grossly intact, follows commands appropriately  Psych:  Pleasantly confused      Medications Reviewed: (see below)    Lab Data Reviewed: (see below)    ______________________________________________________________________    Medications:     Current Facility-Administered Medications   Medication Dose Route Frequency    guaiFENesin (ROBITUSSIN) 100 
Orders received, chart reviewed and patient evaluated by physical therapy. Pending progression with skilled acute physical therapy, recommend:  Patient was able to ambulate short distance and transfer to sit up in chair.  Vitals were stable, able to wean to room air and sats >90%.  Patient without reports of pain with any upright movement.    Intermittent physical therapy up to 2-3x/week in previous living setting    Recommend with nursing OOB to chair 3x/day and walking daily with CGA assist and rolling walker. Thank you for completing as able in order to maintain patient strength, endurance and independence.     Full evaluation to follow.      Anneliese Kraft PT,DPT,NCS,CLT    
Physical Therapy    Chart reviewed and spoke with RN. RN was under impression therapy had already mobilized pt as she is currently in the chair. Nurse reports pt was disoriented and perseverative earlier this morning (pt stating she needs to leave and \"go home\"). Pt calm at this time and resting with BLEs elevated in recliner- no alarm or pad underneath her. Nurse is requesting therapy defer at this time and allow pt to rest so as not to become agitated again (RN will assist pt back to bed and place alarm/pad later). Will check back tomorrow as able and appropriate for progression of functional mobility.    PEDRO HOYT, PT    
Physical Therapy  Patient admitted for fall and new PNA, ECHO at bedside.  We will continue to follow.  Anneliese Kraft PT,DPT,NCS,CLT    
Spiritual Care Partner Volunteer visited patient at [facility] in [facility] A3 OBSERVATION UNIT on 3/3/2025     Documented by:  Ayan Niño MDiv  Staff   Paging Service [phone] (MADELEINE)    
hypoxic respiratory failure iso Community-acquired pneumonia: O2 saturation was 85% on room air in the ED. Improved with nasal cannula. CT cervical spine w incidental left upper lobe pneumonia. COVID, influenza A/B negative.    -Weaned to RA by PT today, no O2 requirement w activity  -Continue ceftriaxone, doxycycline for now. Check procal in am  -Sputum culture pending     Acute on chronic HFpEF exacerbation, POA: -> 2684. CXR showed no pulmonary edema or pleural effusion. BLE edema. Echo w normal left ventricular systolic function with a visually estimated EF of 55 - 60%. EF by visual approximation is 55%. Left ventricle size is normal. Normal wall thickness. Normal wall motion. Abnormal diastolic function.   -Continue with low-dose IV diuresis- can likely wean soon (BUN/Cr 23, Cr improved), GDMT w PTA metoprolol 25 mg po daily, likely not on ACE/ARB/ARNI 2/2 soft Bps/renal fx  -Monitor I&Os, daily weights, bladder scans. No output recorded currently  -Check BNP again in am  -Follow up o/p w [facility] cardiology     History of A-fib, bradycardia: s/p [employer]  RF pacemaker implanted in January 2010.  CT head negative for any intracranial bleed.  -Continue Eliquis and metoprolol    Hypernatremia  146, likely IVVD 2/2 diuresis, monitor     Hypokalemia, POA, resolved S/p repletion     Elevated creatinine, POA improved 1.04-> 0.86     History of dementia: Appears at baseline, CTM neuro status & cont supportive measures    **Prior records, notes, labs, radiology, and medications reviewed in electronic medical record**                 Care Plan discussed with: Patient, Care Manager, and Nursing Staff    Discussed:  Care Plan    Prophylaxis:   Eliquis    Disposition:  nursing home        ___________________________________________________    DERIAN Rapp - CNP       
labs, radiology, and medications reviewed in electronic medical record**                                                                                                                 Care Plan discussed with: Patient, Care Manager, and Nursing Staff     Discussed:  Care Plan     Prophylaxis:   Eliquis     Disposition:  [facility]    ___________________________________________________    Richard Segura MD

## 2025-03-18 ENCOUNTER — HOSPITAL ENCOUNTER (INPATIENT)
Facility: HOSPITAL | Age: 89
LOS: 1 days | Discharge: NURSING, REHAB OR LONG-TERM CARE | DRG: 758 | End: 2025-03-20
Attending: STUDENT IN AN ORGANIZED HEALTH CARE EDUCATION/TRAINING PROGRAM | Admitting: INTERNAL MEDICINE
Payer: MEDICARE

## 2025-03-18 ENCOUNTER — APPOINTMENT (OUTPATIENT)
Facility: HOSPITAL | Age: 89
DRG: 758 | End: 2025-03-18
Payer: MEDICARE

## 2025-03-18 DIAGNOSIS — N17.9 AKI (ACUTE KIDNEY INJURY): ICD-10-CM

## 2025-03-18 DIAGNOSIS — R55 SYNCOPE AND COLLAPSE: Primary | ICD-10-CM

## 2025-03-18 DIAGNOSIS — E87.6 HYPOKALEMIA: ICD-10-CM

## 2025-03-18 LAB
ALBUMIN SERPL-MCNC: 3.3 G/DL (ref 3.5–5)
ALBUMIN/GLOB SERPL: 0.9 (ref 1.1–2.2)
ALP SERPL-CCNC: 128 U/L (ref 45–117)
ALT SERPL-CCNC: 20 U/L (ref 12–78)
ANION GAP SERPL CALC-SCNC: 7 MMOL/L (ref 2–12)
APPEARANCE UR: ABNORMAL
AST SERPL-CCNC: 19 U/L (ref 15–37)
BACTERIA URNS QL MICRO: NEGATIVE /HPF
BASOPHILS # BLD: 0.08 K/UL (ref 0–0.1)
BASOPHILS NFR BLD: 1 % (ref 0–1)
BILIRUB SERPL-MCNC: 0.9 MG/DL (ref 0.2–1)
BILIRUB UR QL: NEGATIVE
BUN SERPL-MCNC: 12 MG/DL (ref 6–20)
BUN/CREAT SERPL: 10 (ref 12–20)
CALCIUM SERPL-MCNC: 9.3 MG/DL (ref 8.5–10.1)
CAOX CRY URNS QL MICRO: ABNORMAL
CHLORIDE SERPL-SCNC: 105 MMOL/L (ref 97–108)
CO2 SERPL-SCNC: 27 MMOL/L (ref 21–32)
COLOR UR: ABNORMAL
COMMENT:: NORMAL
CREAT SERPL-MCNC: 1.25 MG/DL (ref 0.55–1.02)
D DIMER PPP FEU-MCNC: 1 MG/L FEU (ref 0–0.65)
DIFFERENTIAL METHOD BLD: ABNORMAL
EOSINOPHIL # BLD: 0.16 K/UL (ref 0–0.4)
EOSINOPHIL NFR BLD: 2 % (ref 0–7)
EPITH CASTS URNS QL MICRO: ABNORMAL /LPF
ERYTHROCYTE [DISTWIDTH] IN BLOOD BY AUTOMATED COUNT: 13.7 % (ref 11.5–14.5)
GLOBULIN SER CALC-MCNC: 3.7 G/DL (ref 2–4)
GLUCOSE SERPL-MCNC: 149 MG/DL (ref 65–100)
GLUCOSE UR STRIP.AUTO-MCNC: NEGATIVE MG/DL
HCT VFR BLD AUTO: 37 % (ref 35–47)
HGB BLD-MCNC: 12.4 G/DL (ref 11.5–16)
HGB UR QL STRIP: NEGATIVE
HYALINE CASTS URNS QL MICRO: ABNORMAL /LPF (ref 0–5)
IMM GRANULOCYTES # BLD AUTO: 0.05 K/UL (ref 0–0.04)
IMM GRANULOCYTES NFR BLD AUTO: 0.6 % (ref 0–0.5)
KETONES UR QL STRIP.AUTO: ABNORMAL MG/DL
LEUKOCYTE ESTERASE UR QL STRIP.AUTO: ABNORMAL
LYMPHOCYTES # BLD: 1.44 K/UL (ref 0.8–3.5)
LYMPHOCYTES NFR BLD: 18.3 % (ref 12–49)
MCH RBC QN AUTO: 32.4 PG (ref 26–34)
MCHC RBC AUTO-ENTMCNC: 33.5 G/DL (ref 30–36.5)
MCV RBC AUTO: 96.6 FL (ref 80–99)
MONOCYTES # BLD: 0.63 K/UL (ref 0–1)
MONOCYTES NFR BLD: 8 % (ref 5–13)
NEUTS SEG # BLD: 5.51 K/UL (ref 1.8–8)
NEUTS SEG NFR BLD: 70.1 % (ref 32–75)
NITRITE UR QL STRIP.AUTO: NEGATIVE
NRBC # BLD: 0 K/UL (ref 0–0.01)
NRBC BLD-RTO: 0 PER 100 WBC
PH UR STRIP: 5 (ref 5–8)
PLATELET # BLD AUTO: 208 K/UL (ref 150–400)
PMV BLD AUTO: 10.8 FL (ref 8.9–12.9)
POTASSIUM SERPL-SCNC: 2.9 MMOL/L (ref 3.5–5.1)
PROT SERPL-MCNC: 7 G/DL (ref 6.4–8.2)
PROT UR STRIP-MCNC: ABNORMAL MG/DL
RBC # BLD AUTO: 3.83 M/UL (ref 3.8–5.2)
RBC #/AREA URNS HPF: ABNORMAL /HPF (ref 0–5)
SODIUM SERPL-SCNC: 139 MMOL/L (ref 136–145)
SP GR UR REFRACTOMETRY: 1.02 (ref 1–1.03)
SPECIMEN HOLD: NORMAL
SPECIMEN HOLD: NORMAL
TROPONIN I SERPL HS-MCNC: 16 NG/L (ref 0–51)
UROBILINOGEN UR QL STRIP.AUTO: 1 EU/DL (ref 0.2–1)
WBC # BLD AUTO: 7.9 K/UL (ref 3.6–11)
WBC URNS QL MICRO: ABNORMAL /HPF (ref 0–4)
YEAST URNS QL MICRO: PRESENT

## 2025-03-18 PROCEDURE — 96375 TX/PRO/DX INJ NEW DRUG ADDON: CPT

## 2025-03-18 PROCEDURE — 70450 CT HEAD/BRAIN W/O DYE: CPT

## 2025-03-18 PROCEDURE — 6370000000 HC RX 637 (ALT 250 FOR IP): Performed by: STUDENT IN AN ORGANIZED HEALTH CARE EDUCATION/TRAINING PROGRAM

## 2025-03-18 PROCEDURE — 85025 COMPLETE CBC W/AUTO DIFF WBC: CPT

## 2025-03-18 PROCEDURE — 36415 COLL VENOUS BLD VENIPUNCTURE: CPT

## 2025-03-18 PROCEDURE — 81001 URINALYSIS AUTO W/SCOPE: CPT

## 2025-03-18 PROCEDURE — 87086 URINE CULTURE/COLONY COUNT: CPT

## 2025-03-18 PROCEDURE — 84484 ASSAY OF TROPONIN QUANT: CPT

## 2025-03-18 PROCEDURE — 99285 EMERGENCY DEPT VISIT HI MDM: CPT

## 2025-03-18 PROCEDURE — 87088 URINE BACTERIA CULTURE: CPT

## 2025-03-18 PROCEDURE — 96365 THER/PROPH/DIAG IV INF INIT: CPT

## 2025-03-18 PROCEDURE — 87186 SC STD MICRODIL/AGAR DIL: CPT

## 2025-03-18 PROCEDURE — 93005 ELECTROCARDIOGRAM TRACING: CPT | Performed by: EMERGENCY MEDICINE

## 2025-03-18 PROCEDURE — 6360000002 HC RX W HCPCS: Performed by: STUDENT IN AN ORGANIZED HEALTH CARE EDUCATION/TRAINING PROGRAM

## 2025-03-18 PROCEDURE — 85379 FIBRIN DEGRADATION QUANT: CPT

## 2025-03-18 PROCEDURE — 80053 COMPREHEN METABOLIC PANEL: CPT

## 2025-03-18 RX ORDER — POTASSIUM CHLORIDE 750 MG/1
40 TABLET, EXTENDED RELEASE ORAL ONCE
Status: COMPLETED | OUTPATIENT
Start: 2025-03-18 | End: 2025-03-18

## 2025-03-18 RX ORDER — POTASSIUM CHLORIDE 7.45 MG/ML
10 INJECTION INTRAVENOUS ONCE
Status: COMPLETED | OUTPATIENT
Start: 2025-03-18 | End: 2025-03-19

## 2025-03-18 RX ORDER — IOPAMIDOL 755 MG/ML
100 INJECTION, SOLUTION INTRAVASCULAR
Status: DISCONTINUED | OUTPATIENT
Start: 2025-03-18 | End: 2025-03-19

## 2025-03-18 RX ORDER — ONDANSETRON 2 MG/ML
4 INJECTION INTRAMUSCULAR; INTRAVENOUS ONCE
Status: COMPLETED | OUTPATIENT
Start: 2025-03-18 | End: 2025-03-18

## 2025-03-18 RX ADMIN — ONDANSETRON 4 MG: 2 INJECTION, SOLUTION INTRAMUSCULAR; INTRAVENOUS at 23:42

## 2025-03-18 RX ADMIN — POTASSIUM CHLORIDE 20 MEQ: 750 TABLET, FILM COATED, EXTENDED RELEASE ORAL at 23:34

## 2025-03-18 RX ADMIN — POTASSIUM CHLORIDE 10 MEQ: 7.46 INJECTION, SOLUTION INTRAVENOUS at 23:38

## 2025-03-18 ASSESSMENT — LIFESTYLE VARIABLES
HOW MANY STANDARD DRINKS CONTAINING ALCOHOL DO YOU HAVE ON A TYPICAL DAY: PATIENT DOES NOT DRINK
HOW OFTEN DO YOU HAVE A DRINK CONTAINING ALCOHOL: NEVER

## 2025-03-18 ASSESSMENT — PAIN - FUNCTIONAL ASSESSMENT: PAIN_FUNCTIONAL_ASSESSMENT: NONE - DENIES PAIN

## 2025-03-19 ENCOUNTER — APPOINTMENT (OUTPATIENT)
Facility: HOSPITAL | Age: 89
DRG: 758 | End: 2025-03-19
Payer: MEDICARE

## 2025-03-19 PROBLEM — R42 DIZZINESS: Status: ACTIVE | Noted: 2025-03-19

## 2025-03-19 PROBLEM — R42 LIGHTHEADEDNESS: Status: ACTIVE | Noted: 2025-03-19

## 2025-03-19 LAB
ANION GAP SERPL CALC-SCNC: 6 MMOL/L (ref 2–12)
BUN SERPL-MCNC: 11 MG/DL (ref 6–20)
BUN/CREAT SERPL: 10 (ref 12–20)
CALCIUM SERPL-MCNC: 8.6 MG/DL (ref 8.5–10.1)
CHLORIDE SERPL-SCNC: 105 MMOL/L (ref 97–108)
CO2 SERPL-SCNC: 31 MMOL/L (ref 21–32)
CREAT SERPL-MCNC: 1.12 MG/DL (ref 0.55–1.02)
EKG DIAGNOSIS: NORMAL
EKG Q-T INTERVAL: 552 MS
EKG QRS DURATION: 98 MS
EKG QTC CALCULATION (BAZETT): 517 MS
EKG R AXIS: 25 DEGREES
EKG T AXIS: 117 DEGREES
EKG VENTRICULAR RATE: 53 BPM
GLUCOSE SERPL-MCNC: 101 MG/DL (ref 65–100)
POTASSIUM SERPL-SCNC: 3.6 MMOL/L (ref 3.5–5.1)
SODIUM SERPL-SCNC: 142 MMOL/L (ref 136–145)

## 2025-03-19 PROCEDURE — 6370000000 HC RX 637 (ALT 250 FOR IP): Performed by: INTERNAL MEDICINE

## 2025-03-19 PROCEDURE — 96366 THER/PROPH/DIAG IV INF ADDON: CPT

## 2025-03-19 PROCEDURE — 97535 SELF CARE MNGMENT TRAINING: CPT

## 2025-03-19 PROCEDURE — 97116 GAIT TRAINING THERAPY: CPT

## 2025-03-19 PROCEDURE — 6360000002 HC RX W HCPCS: Performed by: INTERNAL MEDICINE

## 2025-03-19 PROCEDURE — 97165 OT EVAL LOW COMPLEX 30 MIN: CPT

## 2025-03-19 PROCEDURE — 1100000000 HC RM PRIVATE

## 2025-03-19 PROCEDURE — G0378 HOSPITAL OBSERVATION PER HR: HCPCS

## 2025-03-19 PROCEDURE — 94761 N-INVAS EAR/PLS OXIMETRY MLT: CPT

## 2025-03-19 PROCEDURE — 93010 ELECTROCARDIOGRAM REPORT: CPT | Performed by: INTERNAL MEDICINE

## 2025-03-19 PROCEDURE — 2500000003 HC RX 250 WO HCPCS: Performed by: INTERNAL MEDICINE

## 2025-03-19 PROCEDURE — 97162 PT EVAL MOD COMPLEX 30 MIN: CPT

## 2025-03-19 PROCEDURE — 80048 BASIC METABOLIC PNL TOTAL CA: CPT

## 2025-03-19 PROCEDURE — 97530 THERAPEUTIC ACTIVITIES: CPT

## 2025-03-19 RX ORDER — POTASSIUM CHLORIDE 750 MG/1
40 TABLET, EXTENDED RELEASE ORAL PRN
Status: DISCONTINUED | OUTPATIENT
Start: 2025-03-19 | End: 2025-03-20 | Stop reason: HOSPADM

## 2025-03-19 RX ORDER — ACETAMINOPHEN 325 MG/1
650 TABLET ORAL EVERY 6 HOURS PRN
Status: DISCONTINUED | OUTPATIENT
Start: 2025-03-19 | End: 2025-03-20 | Stop reason: HOSPADM

## 2025-03-19 RX ORDER — METOPROLOL SUCCINATE 25 MG/1
25 TABLET, EXTENDED RELEASE ORAL DAILY
Status: DISCONTINUED | OUTPATIENT
Start: 2025-03-19 | End: 2025-03-19

## 2025-03-19 RX ORDER — SODIUM CHLORIDE 0.9 % (FLUSH) 0.9 %
5-40 SYRINGE (ML) INJECTION EVERY 12 HOURS SCHEDULED
Status: DISCONTINUED | OUTPATIENT
Start: 2025-03-19 | End: 2025-03-20 | Stop reason: HOSPADM

## 2025-03-19 RX ORDER — FUROSEMIDE 20 MG/1
20 TABLET ORAL DAILY
Status: DISCONTINUED | OUTPATIENT
Start: 2025-03-19 | End: 2025-03-20 | Stop reason: HOSPADM

## 2025-03-19 RX ORDER — MAGNESIUM SULFATE IN WATER 40 MG/ML
2000 INJECTION, SOLUTION INTRAVENOUS PRN
Status: DISCONTINUED | OUTPATIENT
Start: 2025-03-19 | End: 2025-03-20 | Stop reason: HOSPADM

## 2025-03-19 RX ORDER — ONDANSETRON 4 MG/1
4 TABLET, ORALLY DISINTEGRATING ORAL EVERY 8 HOURS PRN
Status: DISCONTINUED | OUTPATIENT
Start: 2025-03-19 | End: 2025-03-20 | Stop reason: HOSPADM

## 2025-03-19 RX ORDER — MICONAZOLE NITRATE 2 %
1 CREAM WITH APPLICATOR VAGINAL NIGHTLY
Status: DISCONTINUED | OUTPATIENT
Start: 2025-03-19 | End: 2025-03-20 | Stop reason: HOSPADM

## 2025-03-19 RX ORDER — METOPROLOL SUCCINATE 25 MG/1
25 TABLET, EXTENDED RELEASE ORAL DAILY
Status: DISCONTINUED | OUTPATIENT
Start: 2025-03-20 | End: 2025-03-20 | Stop reason: HOSPADM

## 2025-03-19 RX ORDER — POLYETHYLENE GLYCOL 3350 17 G/17G
17 POWDER, FOR SOLUTION ORAL DAILY PRN
Status: DISCONTINUED | OUTPATIENT
Start: 2025-03-19 | End: 2025-03-20 | Stop reason: HOSPADM

## 2025-03-19 RX ORDER — ACETAMINOPHEN 650 MG/1
650 SUPPOSITORY RECTAL EVERY 6 HOURS PRN
Status: DISCONTINUED | OUTPATIENT
Start: 2025-03-19 | End: 2025-03-20 | Stop reason: HOSPADM

## 2025-03-19 RX ORDER — POTASSIUM CHLORIDE 7.45 MG/ML
10 INJECTION INTRAVENOUS
Status: DISPENSED | OUTPATIENT
Start: 2025-03-19 | End: 2025-03-19

## 2025-03-19 RX ORDER — POTASSIUM CHLORIDE 7.45 MG/ML
10 INJECTION INTRAVENOUS PRN
Status: DISCONTINUED | OUTPATIENT
Start: 2025-03-19 | End: 2025-03-20 | Stop reason: HOSPADM

## 2025-03-19 RX ORDER — ONDANSETRON 2 MG/ML
4 INJECTION INTRAMUSCULAR; INTRAVENOUS EVERY 6 HOURS PRN
Status: DISCONTINUED | OUTPATIENT
Start: 2025-03-19 | End: 2025-03-20 | Stop reason: HOSPADM

## 2025-03-19 RX ORDER — SODIUM CHLORIDE 0.9 % (FLUSH) 0.9 %
5-40 SYRINGE (ML) INJECTION PRN
Status: DISCONTINUED | OUTPATIENT
Start: 2025-03-19 | End: 2025-03-20 | Stop reason: HOSPADM

## 2025-03-19 RX ORDER — ENOXAPARIN SODIUM 100 MG/ML
40 INJECTION SUBCUTANEOUS DAILY
Status: DISCONTINUED | OUTPATIENT
Start: 2025-03-19 | End: 2025-03-19

## 2025-03-19 RX ORDER — SODIUM CHLORIDE 9 MG/ML
INJECTION, SOLUTION INTRAVENOUS PRN
Status: DISCONTINUED | OUTPATIENT
Start: 2025-03-19 | End: 2025-03-20 | Stop reason: HOSPADM

## 2025-03-19 RX ADMIN — APIXABAN 2.5 MG: 2.5 TABLET, FILM COATED ORAL at 20:25

## 2025-03-19 RX ADMIN — METOPROLOL SUCCINATE 25 MG: 25 TABLET, EXTENDED RELEASE ORAL at 09:23

## 2025-03-19 RX ADMIN — POTASSIUM CHLORIDE 10 MEQ: 7.45 INJECTION INTRAVENOUS at 09:21

## 2025-03-19 RX ADMIN — WATER 1000 MG: 1 INJECTION INTRAMUSCULAR; INTRAVENOUS; SUBCUTANEOUS at 05:57

## 2025-03-19 RX ADMIN — POTASSIUM CHLORIDE 10 MEQ: 7.45 INJECTION INTRAVENOUS at 05:55

## 2025-03-19 RX ADMIN — APIXABAN 2.5 MG: 2.5 TABLET, FILM COATED ORAL at 09:21

## 2025-03-19 RX ADMIN — SODIUM CHLORIDE, PRESERVATIVE FREE 10 ML: 5 INJECTION INTRAVENOUS at 20:25

## 2025-03-19 RX ADMIN — SODIUM CHLORIDE, PRESERVATIVE FREE 10 ML: 5 INJECTION INTRAVENOUS at 09:23

## 2025-03-19 RX ADMIN — POTASSIUM CHLORIDE 10 MEQ: 7.45 INJECTION INTRAVENOUS at 12:49

## 2025-03-19 RX ADMIN — POTASSIUM CHLORIDE 10 MEQ: 7.46 INJECTION, SOLUTION INTRAVENOUS at 16:59

## 2025-03-19 ASSESSMENT — HEART SCORE: ECG: NON-SPECIFC REPOLARIZATION DISTURBANCE/LBTB/PM

## 2025-03-19 NOTE — ED NOTES
Bedside and Verbal shift change report given to Deshawn (oncoming nurse) by ANABEL (offgoing nurse). Report included the following information ED Encounter Summary, ED SBAR, MAR, and Recent Results.

## 2025-03-19 NOTE — CARE COORDINATION
Case Management Assessment  Initial Evaluation    Date/Time of Evaluation: 3/19/2025 4:15 PM  Assessment Completed by: SUSAN PHIPPS    If patient is discharged prior to next notation, then this note serves as note for discharge by case management.    Patient Name: Naty Mendoza                   YOB: 1930  Diagnosis: Syncope and collapse [R55]  Hypokalemia [E87.6]  Dizziness [R42]  TINA (acute kidney injury) [N17.9]  Lightheadedness [R42]                   Date / Time: 3/18/2025  8:53 PM    Patient Admission Status: Inpatient   Readmission Risk (Low < 19, Mod (19-27), High > 27): Readmission Risk Score: 12.8    Current PCP: Naty Jones MD  PCP verified by CM? Yes    Chart Reviewed: Yes      History Provided by: Unable to Assess (pt known to me from previous hospitalization)  Patient Orientation: Other (see comment) (pt is with dementia)    Patient Cognition: Alert (alert but not fully oriented)    Hospitalization in the last 30 days (Readmission):  Yes    If yes, Readmission Assessment in  Navigator will be completed.    Advance Directives:      Code Status: Full Code   Patient's Primary Decision Maker is:        Discharge Planning:    Patient lives with:   Type of Home:    Primary Care Giver: Other (Comment) (Pt resides at [facility])  Patient Support Systems include: Children (son lives in [place])   Current Financial resources: Medicare  Current community resources:    Current services prior to admission:              Current DME:              Type of Home Care services:  PT ([facility] (PT))    ADLS  Prior functional level: Assistance with the following:, Bathing, Dressing, Toileting  Current functional level: Assistance with the following:, Bathing, Dressing, Toileting    PT AM-PAC:   /24  OT AM-PAC:   /24    Family can provide assistance at DC: Other (comment) ([facility] staff)  Would you like Case Management to discuss the discharge plan with any other family members/significant  w/c van will be needed    CM will continue to follow pt for definitive discharge plan ie SNF vs return to [facility]  Vincent PHIPPS  Case Management Department

## 2025-03-19 NOTE — CARE COORDINATION
READMISSION:  Pt was admitted to [facility] from 3/2 - 3/6.  Pt discharged to [facility] where she resides.  She was readmitted to  on 3/18 with syncope.  [facility] was arranged post hospital discharge.     03/19/25 9603   Service Assessment   Patient Orientation Other (see comment)  (pt is with dementia)   Cognition Alert  (alert but not fully oriented)   History Provided By Unable to Assess  (pt known to me from previous hospitalization)   Primary Caregiver Other (Comment)  (Pt resides at [facility])   Support Systems Children  (son lives in [place])   PCP Verified by CM Yes   Last Visit to PCP   (unknown)   Prior Functional Level Assistance with the following:;Bathing;Dressing;Toileting   Current Functional Level Assistance with the following:;Bathing;Dressing;Toileting   Can patient return to prior living arrangement Yes   Ability to make needs known: Unable   Family able to assist with home care needs: Other (comment)  ([facility] staff)   Financial Resources Medicare   Social/Functional History   Lives With Other (Comment)  ([facility])   Type of Home Assisted living   Home Equipment Wheelchair - Manual   Receives Help From Other (Comment)  ([facility])   Toileting Needs assistance   Ambulation Assistance Non-ambulatory   Prior Level of Assist for Transfers Needs assistance   Active  No   Discharge Planning   DME Ordered? No   Type of Home Care Services PT  ([facility] (PT))

## 2025-03-19 NOTE — PROGRESS NOTES
JAGDEEP LEE [facility]  [address] [place] [address] [phone]        Hospitalist Progress Note      NAME: Naty Mendoza   :  1930  MRM:  [id number]    Date/Time of service: 3/19/2025  12:41 PM       Subjective:     Chief Complaint:  Patient was personally seen and examined by me during this time period.  Chart reviewed.      Pt was seen this am resting comfortably in bed, pleasantly confused. Reports she slept well last night. Denies any urinary complaints.     Called & left  w CB # for pt's step-son Tay to discuss plan of care.       Objective:       Vitals:       Last 24hrs VS reviewed since prior progress note. Most recent are:    Vitals:    25 1155   BP: 116/74   Pulse: 55   Resp:    Temp:    SpO2:      SpO2 Readings from Last 6 Encounters:   25 96%   25 96%   24 95%          Intake/Output Summary (Last 24 hours) at 3/19/2025 1241  Last data filed at 3/19/2025 0630  Gross per 24 hour   Intake 100 ml   Output --   Net 100 ml        Exam:     Physical Exam:    Gen:  Well-developed, well-nourished, in no acute distress  HEENT:  Pink conjunctivae, hearing intact to voice, moist mucous membranes  Neck:  Supple, without masses, thyroid non-tender  Resp:  No accessory muscle use, clear breath sounds without wheezes rales or rhonchi  Card:  S1, S2 without thrills, bruits. Chronic BLE edema, stable  Abd:  Soft, non-tender, non-distended, normoactive bowel sounds are present  Musc:  No cyanosis or clubbing  Skin:  No rashes or ulcers, skin turgor is good  Neuro:  Cranial nerves 3-12 are grossly intact, follows commands appropriately  Psych:  Pleasantly confused, oriented to person, place      Medications Reviewed: (see below)    Lab Data Reviewed: (see below)    ______________________________________________________________________    Medications:     Current Facility-Administered Medications   Medication Dose Route Frequency    sodium chloride

## 2025-03-19 NOTE — ED PROVIDER NOTES
[facility] EMERGENCY DEPARTMENT  EMERGENCY DEPARTMENT ENCOUNTER      Pt Name: Naty Mendoza  MRN: [id number]  Birthdate 4/27/1930  Date of evaluation: 3/18/2025  Provider: Talia Simeon MD    CHIEF COMPLAINT       Chief Complaint   Patient presents with   • Dizziness         HISTORY OF PRESENT ILLNESS   (Location/Symptom, Timing/Onset, Context/Setting, Quality, Duration, Modifying Factors, Severity)  Note limiting factors.   The history is provided by the patient, a caregiver and the EMS personnel.     89-year-old female with a history of hypertension, heart failure, DVT presenting for syncope.  Patient arrives from [facility] for not feeling well, arrives via EMS, staff report that patient has been going in and out of consciousness this evening while sitting on the toilet.  Patient is at her mental status baseline.  Has a bruise on her forehead, unclear how long ago this fall or head injury occurred.  Patient is limited historian due to her dementia.  Reports no concerns at this time denies pain.  Reports no recent sick symptoms.  Pt does not recall a recent fall.    Review of External Medical Records:         Nursing Notes were reviewed.      REVIEW OF SYSTEMS    (2-9 systems for level 4, 10 or more for level 5)     Except as noted above the remainder of the review of systems was reviewed and negative.       PAST MEDICAL HISTORY     Past Medical History:   Diagnosis Date   • DVT of axillary vein, acute (HCC)    • Heart failure (HCC)    • Hypertension          SURGICAL HISTORY     No past surgical history on file.      CURRENT MEDICATIONS       Previous Medications    APIXABAN (ELIQUIS) 2.5 MG TABS TABLET    Take 1 tablet by mouth 2 times daily    FUROSEMIDE (LASIX) 40 MG TABLET    Take 0.5 tablets by mouth daily    GUAIFENESIN (ROBITUSSIN) 100 MG/5ML LIQUID    Take 20 mLs by mouth every 4 hours as needed for Cough    METOPROLOL SUCCINATE (TOPROL XL) 25 MG EXTENDED  REFERRED TO:  No follow-up provider specified.    DISCHARGE MEDICATIONS:  New Prescriptions    No medications on file         (Please note that portions of this note were completed with a voice recognition program.  Efforts were made to edit the dictations but occasionally words are mis-transcribed.)    Talia Simeon MD (electronically signed)  Emergency Attending Physician               Talia Simeon MD  03/19/25 1841

## 2025-03-19 NOTE — ED TRIAGE NOTES
Patient arrives via EMS from [facility] for not feeling well. Per the facility staff the patient was going in and out of consciousness this evening while sitting on the toilet; staff states she has had some mental decline over the last few days.  Patient is alert and oriented hx of dementia confused at baseline.    Healing bruise and not noted to forehead. Patient states it happened recently.

## 2025-03-19 NOTE — PLAN OF CARE
Problem: Occupational Therapy - Adult  Goal: By Discharge: Performs self-care activities at highest level of function for planned discharge setting.  See evaluation for individualized goals.  Description: FUNCTIONAL STATUS PRIOR TO ADMISSION: Mobility status unknown. Lives in [facility]. Has noted dementia.     HOME SUPPORT PRIOR TO ADMISSION: The patient lived in [facility] setting. Hx includes dementia at baseline.       Occupational Therapy Goals:  Initiated 3/19/2025  1.  Patient will perform lower body dressing with Contact Guard Assist within 7 day(s).  2.  Patient will perform grooming, in standing,  with Stand by Assist within 7 day(s).  4.  Patient will perform toilet transfers with Contact Guard Assist  within 7 day(s).  5.  Patient will perform all aspects of toileting with Contact Guard Assist within 7 day(s).  6.  Patient will participate in upper extremity therapeutic exercise/activities with Supervision for 10 minutes within 7 day(s).    7.  Patient will utilize energy conservation techniques during functional activities with verbal cues within 7 day(s).    Outcome: Progressing   OCCUPATIONAL THERAPY EVALUATION    Patient: Naty Mendoza (89 y.o. female)  Date: 3/19/2025  Primary Diagnosis: Syncope and collapse [R55]  Hypokalemia [E87.6]  Dizziness [R42]  TINA (acute kidney injury) [N17.9]         Precautions: Fall Risk, Other (Comment) (dementia)                  ASSESSMENT :  The patient is limited by decreased functional mobility, independence in ADLs, strength, body mechanics, activity tolerance, safety awareness, cognition, attention/concentration, coordination, balance, following admission for episodes of syncope while on commode at [facility].  Patient received in bed, agreeable to activity and very pleasant.  She is confused and noted with history of dementia.  Patient unable to give information regarding level of assist at baseline.  However, she follows commands and demonstrates CGA to EOB.  She  cognitive, or psychosocial skills that result in activity limitations and/or participation restrictions LOW Complexity: No comorbidities that affect functional and  no verbal  or physical assist needed to complete eval tasks   Based on the above components, the patient evaluation is determined to be of the following complexity level: Low

## 2025-03-19 NOTE — FLOWSHEET NOTE
Pt refused CT scan. Pt stated that she has a right to refuse and that she wasn't going into that CT scanner.

## 2025-03-19 NOTE — H&P
Assessment:   Given the patient's current clinical presentation, there is a high level of concern for decompensation if discharged from the emergency department. Complex decision making was performed, which includes reviewing the patient's available past medical records, laboratory results, and imaging studies.    Principal Problem:    Dizziness  Resolved Problems:    * No resolved hospital problems. *      Plan:     Naty Mendoza is a very pleasant 89 y.o. female with a past medical history HFpEF, A-fib with bradycardia s/p pacemaker, hypokalemia, mild dementia, chronic anemia, who presents to the emergency room due to decreased level of consciousness.    Lightheadedness/presyncope  - Dimer slightly elevated, patient refused CTA  - Orthostatics ordered  - Current blood pressure and pulse within normal limits  - Does have a new TINA and hypokalemia  - Hold diuretic  - Replete potassium  - Repeat renal panel  - Continuous telemetry  - Had an echo earlier this year-EF 55%  -Treat hypokalemia, TINA, and UTI as below    UTI?  - Positive leukocyte esterase, no nitrites, positive yeast and WBCs  - Will do a 3-day course of Rocephin  - Will also treat vaginal yeast infection    History of heart failure  History of A-fib  - Holding diuresis tonight  - Continuous telemetry  - Will resume furosemide at discharge-of note is 1/2 pill  - Resume metoprolol  - Resume apixaban at age/renal adjusted dose  - Follows with [facility] cardiology    History of dementia  - Was oriented earlier this evening, at the time of my evaluation is confused  - Likely sundowning  - Delirium precautions    VA POLST COMPLETED: No    DIET: ADULT DIET; Regular   ISOLATION PRECAUTIONS: No active isolations  CODE STATUS: Full Code   DVT PROPHYLAXIS: LMW/Heparin  EMERGENCY CONTACT/SURROGATE DECISION MAKER: Extended Emergency Contact Information  Primary Emergency Contact: Tay Mendoza  Home Phone: [phone]  Work Phone: [phone]  Mobile Phone:  [phone]  Relation: Step Child  Secondary Emergency Contact: Alina Bowling   [place]  Home Phone: [phone]  Mobile Phone: [phone]  Relation: Lay Caregiver     CRITICAL CARE WAS PERFORMED FOR THIS ENCOUNTER: 0 minutes performed in critical care, excluding procedures      Signed By: Salud Diaz MD     March 19, 2025         Please note that this dictation may have been completed with Dragon, the computer voice recognition software.  Quite often unanticipated grammatical, syntax, homophones, and other interpretive errors are inadvertently transcribed by the computer software.  Please disregard these errors.  Please excuse any errors that have escaped final proofreading.

## 2025-03-19 NOTE — PLAN OF CARE
using bedrails? []  1 []  2 [x]  3  []  4   2.  Moving from lying on your back to sitting on the side of a flat bed without using bedrails? []  1 []  2 [x]  3  []  4   3.  Moving to and from a bed to a chair (including a wheelchair)? []  1 []  2 [x]  3  []  4   4. Standing up from a chair using your arms (e.g. wheelchair or bedside chair)? []  1 []  2 [x]  3  []  4   5.  Walking in hospital room? []  1 [x]  2 []  3  []  4   6.  Climbing 3-5 steps with a railing? []  1 [x]  2 []  3  []  4     Raw Score:                             Cutoff score <=171,2,3 had higher odds of discharging home with home health or need of SNF/IPR.    1. Brooke Kim, Jeanne Magallanes, Wilner Fair, Shalini Velasquez, Rubio Hampton, Landon Kim.  Validity of the AM-PAC “6-Clicks” Inpatient Daily Activity and Basic Mobility Short Forms. Physical Therapy Mar 2014, 94 3) 379-391; DOI: 10.2522/ptj.63113015  2. Ruy CHAVEZ, Jacob J, Barry J, Italia J. Association of AM-PAC \"6-Clicks\" Basic Mobility and Daily Activity Scores With Discharge Destination. Phys Ther. 2021 4;101(4):nxdd203. doi: 10.1093/ptj/vuyo859. PMID: 33998559.  3. Jami MARTINEZ, Jan D, Joanne S, Matthew K, James S. Activity Measure for Post-Acute Care \"6-Clicks\" Basic Mobility Scores Predict Discharge Destination After Acute Care Hospitalization in Select Patient Groups: A Retrospective, Observational Study. Arch Rehabil Res Clin Transl. 2022 16;4(3):755826. doi: 10.1016/j.arrct..406375. PMID: 59214869; PMCID: IIS9439030.  4. Judy FISH, Christine S, David W, Sravani P. AM-PAC Short Forms Manual 4.0. Revised 2020.                                                                                                                                                                                                                              Pain Ratin/10   Pain Intervention(s):   nursing notified, nursing notified and addressing, elevation, and  repositioning    Activity Tolerance:   Good and Fair     After treatment:   Patient left in no apparent distress sitting up in chair, Call bell within reach, Bed/ chair alarm activated, and Heels elevated for pressure relief    COMMUNICATION/EDUCATION:   The patient's plan of care was discussed with: occupational therapist, registered nurse, and     Patient Education  Education Given To: Patient  Education Provided: Role of Therapy;Plan of Care;Mobility Training;Equipment;Transfer Training  Education Method: Verbal  Barriers to Learning: Cognition  Education Outcome: Continued education needed    Thank you for this referral.  DARREN JUAN, PT  Minutes: 38      Physical Therapy Evaluation Charge Determination   History Examination Presentation Decision-Making   MEDIUM  Complexity : 1-2 comorbidities / personal factors will impact the outcome/ POC  MEDIUM Complexity : 3 Standardized tests and measures addressin body structure, function, activity limitation and / or participation in recreation  MEDIUM Complexity : Evolving with changing characteristics  AM-PAC  MEDIUM   Based on the above components, the patient evaluation is determined to be of the following complexity level: Medium

## 2025-03-20 VITALS
RESPIRATION RATE: 12 BRPM | HEIGHT: 68 IN | BODY MASS INDEX: 29.87 KG/M2 | TEMPERATURE: 97.7 F | SYSTOLIC BLOOD PRESSURE: 117 MMHG | DIASTOLIC BLOOD PRESSURE: 55 MMHG | HEART RATE: 52 BPM | OXYGEN SATURATION: 94 % | WEIGHT: 197.09 LBS

## 2025-03-20 LAB
ANION GAP SERPL CALC-SCNC: 4 MMOL/L (ref 2–12)
BASOPHILS # BLD: 0.1 K/UL (ref 0–0.1)
BASOPHILS NFR BLD: 1.7 % (ref 0–1)
BUN SERPL-MCNC: 12 MG/DL (ref 6–20)
BUN/CREAT SERPL: 11 (ref 12–20)
CALCIUM SERPL-MCNC: 9 MG/DL (ref 8.5–10.1)
CHLORIDE SERPL-SCNC: 108 MMOL/L (ref 97–108)
CO2 SERPL-SCNC: 30 MMOL/L (ref 21–32)
CREAT SERPL-MCNC: 1.14 MG/DL (ref 0.55–1.02)
DIFFERENTIAL METHOD BLD: ABNORMAL
EOSINOPHIL # BLD: 0.19 K/UL (ref 0–0.4)
EOSINOPHIL NFR BLD: 3.2 % (ref 0–7)
ERYTHROCYTE [DISTWIDTH] IN BLOOD BY AUTOMATED COUNT: 13.8 % (ref 11.5–14.5)
GLUCOSE SERPL-MCNC: 98 MG/DL (ref 65–100)
HCT VFR BLD AUTO: 33.3 % (ref 35–47)
HGB BLD-MCNC: 10.7 G/DL (ref 11.5–16)
IMM GRANULOCYTES # BLD AUTO: 0.05 K/UL (ref 0–0.04)
IMM GRANULOCYTES NFR BLD AUTO: 0.8 % (ref 0–0.5)
LYMPHOCYTES # BLD: 1.84 K/UL (ref 0.8–3.5)
LYMPHOCYTES NFR BLD: 30.7 % (ref 12–49)
MCH RBC QN AUTO: 32.6 PG (ref 26–34)
MCHC RBC AUTO-ENTMCNC: 32.1 G/DL (ref 30–36.5)
MCV RBC AUTO: 101.5 FL (ref 80–99)
MONOCYTES # BLD: 0.52 K/UL (ref 0–1)
MONOCYTES NFR BLD: 8.7 % (ref 5–13)
NEUTS SEG # BLD: 3.29 K/UL (ref 1.8–8)
NEUTS SEG NFR BLD: 54.9 % (ref 32–75)
NRBC # BLD: 0 K/UL (ref 0–0.01)
NRBC BLD-RTO: 0 PER 100 WBC
PHOSPHATE SERPL-MCNC: 3.4 MG/DL (ref 2.6–4.7)
PLATELET # BLD AUTO: 167 K/UL (ref 150–400)
PMV BLD AUTO: 11.2 FL (ref 8.9–12.9)
POTASSIUM SERPL-SCNC: 4.3 MMOL/L (ref 3.5–5.1)
RBC # BLD AUTO: 3.28 M/UL (ref 3.8–5.2)
SODIUM SERPL-SCNC: 142 MMOL/L (ref 136–145)
WBC # BLD AUTO: 6 K/UL (ref 3.6–11)

## 2025-03-20 PROCEDURE — 2500000003 HC RX 250 WO HCPCS: Performed by: INTERNAL MEDICINE

## 2025-03-20 PROCEDURE — 6360000002 HC RX W HCPCS: Performed by: INTERNAL MEDICINE

## 2025-03-20 PROCEDURE — 6370000000 HC RX 637 (ALT 250 FOR IP): Performed by: INTERNAL MEDICINE

## 2025-03-20 PROCEDURE — 85025 COMPLETE CBC W/AUTO DIFF WBC: CPT

## 2025-03-20 PROCEDURE — 94761 N-INVAS EAR/PLS OXIMETRY MLT: CPT

## 2025-03-20 PROCEDURE — 80048 BASIC METABOLIC PNL TOTAL CA: CPT

## 2025-03-20 PROCEDURE — 97535 SELF CARE MNGMENT TRAINING: CPT

## 2025-03-20 PROCEDURE — 84100 ASSAY OF PHOSPHORUS: CPT

## 2025-03-20 PROCEDURE — 6370000000 HC RX 637 (ALT 250 FOR IP)

## 2025-03-20 RX ORDER — CEFDINIR 300 MG/1
300 CAPSULE ORAL 2 TIMES DAILY
Qty: 4 CAPSULE | Refills: 0 | Status: SHIPPED | OUTPATIENT
Start: 2025-03-20 | End: 2025-03-22

## 2025-03-20 RX ADMIN — METOPROLOL SUCCINATE 25 MG: 25 TABLET, EXTENDED RELEASE ORAL at 09:22

## 2025-03-20 RX ADMIN — SODIUM CHLORIDE, PRESERVATIVE FREE 10 ML: 5 INJECTION INTRAVENOUS at 09:23

## 2025-03-20 RX ADMIN — WATER 1000 MG: 1 INJECTION INTRAMUSCULAR; INTRAVENOUS; SUBCUTANEOUS at 04:48

## 2025-03-20 RX ADMIN — APIXABAN 2.5 MG: 2.5 TABLET, FILM COATED ORAL at 09:22

## 2025-03-20 NOTE — CARE COORDINATION
Care Management Progress Note    Reason for Admission:   Syncope and collapse [R55]  Hypokalemia [E87.6]  Dizziness [R42]  TINA (acute kidney injury) [N17.9]  Lightheadedness [R42]         Patient Admission Status: Inpatient  RUR:  14%  Hospitalization in the last 30 days (Readmission):  No          Transition Plan of Care:  Pt will return to [facility].  RN, please call report to [phone].    CM confirmed yesterday that no onsite eval would be needed for pt's return to [facility].  AVS, summary, and today's MAR was faxed to [facility] at [phone].  [facility] had been providing PT services at the [facility].  A resumption order was provided.  Outpatient follow up  CM left vm message for pt's son re: transportation back to [facility]. Son called [facility] and states that they will transport pt in their van between 3:30-4:30 pm     No further discharge needs indicated  Vincent

## 2025-03-20 NOTE — PLAN OF CARE
Problem: Occupational Therapy - Adult  Goal: By Discharge: Performs self-care activities at highest level of function for planned discharge setting.  See evaluation for individualized goals.  Description: FUNCTIONAL STATUS PRIOR TO ADMISSION: Mobility status unknown. Lives in [facility]. Has noted dementia.     HOME SUPPORT PRIOR TO ADMISSION: The patient lived in [facility] setting. Hx includes dementia at baseline.       Occupational Therapy Goals:  Initiated 3/19/2025  1.  Patient will perform lower body dressing with Contact Guard Assist within 7 day(s).  2.  Patient will perform grooming, in standing,  with Stand by Assist within 7 day(s).  4.  Patient will perform toilet transfers with Contact Guard Assist  within 7 day(s).  5.  Patient will perform all aspects of toileting with Contact Guard Assist within 7 day(s).  6.  Patient will participate in upper extremity therapeutic exercise/activities with Supervision for 10 minutes within 7 day(s).    7.  Patient will utilize energy conservation techniques during functional activities with verbal cues within 7 day(s).    Outcome: Progressing   OCCUPATIONAL THERAPY TREATMENT  Patient: Naty Mendoza (89 y.o. female)  Date: 3/20/2025  Primary Diagnosis: Syncope and collapse [R55]  Hypokalemia [E87.6]  Dizziness [R42]  TINA (acute kidney injury) [N17.9]  Lightheadedness [R42]       Precautions: Fall Risk, Other (Comment) (dementia)                Chart, occupational therapy assessment, plan of care, and goals were reviewed.    ASSESSMENT  Patient continues to benefit from skilled OT services and is progressing towards goals. Patient received supine in bed, calling out of room for assist.  Patient distressed regarding finding her clothing. Patient seemingly confused but aware of current location, and situation.  She is agreeable to grooming tasks which she perform with CGA for initiation of task. Patient able to continue grooming tasks without further assistance.

## 2025-03-20 NOTE — DISCHARGE SUMMARY
Hospitalist Discharge Summary     Patient ID:  Naty Mendoza  [id number]  89 y.o.  4/27/1930    Admit date: 3/18/2025    Discharge date and time: 3/20/2025    Admission Diagnoses: Syncope and collapse [R55]  Hypokalemia [E87.6]  Dizziness [R42]  TINA (acute kidney injury) [N17.9]  Lightheadedness [R42]    Discharge Diagnoses:    Principal Problem:    Dizziness  Active Problems:    Lightheadedness  Resolved Problems:    * No resolved hospital problems. *         Hospital Course:   Naty Mendoza is a very pleasant 89 y.o. female with a past medical history HFpEF, A-fib with bradycardia s/p pacemaker, hypokalemia, mild dementia, chronic anemia, who presents to the emergency room due to decreased level of consciousness. Notably pt was recently admitted s/p GLF & CAP (completed atb tx 3/9). She was admitted for further evaluation and treatment of the following:         Lightheadedness/presyncope, PTA, acute. Reported by staff at AL facility, [facility]. UTI vs volume depletion. Will complete abx and I have stopped her diuretic altogether.      TINA with electrolyte disturbance, POA Cr 1.25-> 1.12. Kcl 2.9 -> 3.6 s/p repletion. Diuresis likely contributing. Stop daily diuretic     R/o UTI UA + LE, no nitrites, positive yeast and WBCs. Notably contaminated w epithelials. Empirically tx w cefdinir     History of HFpEF  History of A-fib s/p [employer] pacemaker implanted in January 2010   -Cont home metoprolol, AC. Not on further GDMT likely d/t hypotension noted on prior admissions/renal fx. May need to consider lowering dose of BB considering advanced age & softer Bps/HR while here. Stop diuretic     History of dementia With sundowning  -Continue delirium, fall precautions, supportive measures    Imaging  CT HEAD WO CONTRAST  Result Date: 3/18/2025  1. No evidence of acute intracranial abnormality. 2. Unchanged generalized parenchymal volume loss and moderate chronic microvascular ischemic

## 2025-03-22 LAB
BACTERIA SPEC CULT: ABNORMAL
CC UR VC: ABNORMAL
SERVICE CMNT-IMP: ABNORMAL

## 2025-03-26 NOTE — PROGRESS NOTES
Physician Progress Note      PATIENT:               ASHLY HARVEY  CSN #:                  [id number]  :                       1930  ADMIT DATE:       3/18/2025 8:53 PM  DISCH DATE:        3/20/2025 4:31 PM  RESPONDING  PROVIDER #:        Richard Savage MD          QUERY TEXT:    Good morning.    Patient presented with decreased level of consciousness and was admitted for   dizziness.  Noted documentation of Acute Kidney Injury in  H&P, IM   Progress Note dated  and  DC Summary.    In order to support the diagnosis of TINA, please include additional clinical   indicators in your documentation.? Or please document if the diagnosis of TINA   has been ruled out after further study.    The medical record reflects the following:    Risk Factors: 89 yr old female, HFpEF, A-fib with bradycardia s/p pacemaker,   hypokalemia, mild dementia, chronic anemia, acute illness    Clinical Indicators: 25 21:13 Creatinine: 1.25, 25 07:04   Creatinine: 1.12, 25 04:31 Creatinine: 1.14; from  DC summary: \"TINA   with electrolyte disturbance, POA Cr 1.25-> 1.12. Kcl 2.9 -> 3.6 s/p   repletion. Diuresis likely contributing. Stop daily diuretic\"    Treatment: labs, monitor      Thank you,  Brooklynn Khan RN, CDI  ________________    Defined by Kidney Disease Improving Global Outcomes (KDIGO) clinical practice   guideline for acute kidney injury:  -Increase in SCr by greater than or equal to 0.3 mg/dl within 48 hours; or  -Increase or decrease in SCr to greater than or equal to 1.5 times baseline,   which is known or presumed to have occurred within the prior 7 days; or  -Urine volume < 0.5ml/kg/h for 6 hours.  Options provided:  -- Acute kidney injury evidenced by, Please document evidence as well as a   numerical baseline creatinine, if known.  -- Currently resolved acute kidney injury was evidenced by, Please document   evidence as well as a numerical baseline creatinine, if known.  --